# Patient Record
Sex: MALE | Race: WHITE | NOT HISPANIC OR LATINO | Employment: FULL TIME | URBAN - METROPOLITAN AREA
[De-identification: names, ages, dates, MRNs, and addresses within clinical notes are randomized per-mention and may not be internally consistent; named-entity substitution may affect disease eponyms.]

---

## 2019-07-02 ENCOUNTER — MYC MEDICAL ADVICE (OUTPATIENT)
Dept: INTERNAL MEDICINE | Facility: CLINIC | Age: 29
End: 2019-07-02

## 2019-07-05 ENCOUNTER — HOSPITAL ENCOUNTER (OUTPATIENT)
Dept: LAB | Facility: CLINIC | Age: 29
Discharge: HOME | End: 2019-07-05
Attending: INTERNAL MEDICINE | Admitting: INTERNAL MEDICINE
Payer: COMMERCIAL

## 2019-07-05 ENCOUNTER — TELEPHONE (OUTPATIENT)
Dept: INTERNAL MEDICINE | Facility: CLINIC | Age: 29
End: 2019-07-05

## 2019-07-05 ENCOUNTER — OFFICE VISIT (OUTPATIENT)
Dept: INTERNAL MEDICINE | Facility: CLINIC | Age: 29
End: 2019-07-05
Payer: COMMERCIAL

## 2019-07-05 VITALS
SYSTOLIC BLOOD PRESSURE: 124 MMHG | WEIGHT: 200 LBS | RESPIRATION RATE: 24 BRPM | TEMPERATURE: 97.9 F | HEART RATE: 72 BPM | OXYGEN SATURATION: 97 % | DIASTOLIC BLOOD PRESSURE: 72 MMHG | HEIGHT: 73 IN | BODY MASS INDEX: 26.51 KG/M2

## 2019-07-05 DIAGNOSIS — R79.89 LFT ELEVATION: ICD-10-CM

## 2019-07-05 DIAGNOSIS — B07.9 VIRAL WARTS, UNSPECIFIED TYPE: ICD-10-CM

## 2019-07-05 DIAGNOSIS — Z11.3 SCREEN FOR STD (SEXUALLY TRANSMITTED DISEASE): Primary | ICD-10-CM

## 2019-07-05 DIAGNOSIS — Z00.00 ROUTINE GENERAL MEDICAL EXAMINATION AT A HEALTH CARE FACILITY: Primary | ICD-10-CM

## 2019-07-05 DIAGNOSIS — Z00.00 ENCOUNTER FOR PREVENTATIVE ADULT HEALTH CARE EXAMINATION: Primary | ICD-10-CM

## 2019-07-05 LAB
ALBUMIN SERPL-MCNC: 3.8 G/DL (ref 3.4–5)
ALP SERPL-CCNC: 203 U/L (ref 40–150)
ALT SERPL W P-5'-P-CCNC: 112 U/L (ref 0–70)
ANION GAP SERPL CALCULATED.3IONS-SCNC: 9 MMOL/L (ref 3–14)
AST SERPL W P-5'-P-CCNC: 48 U/L (ref 0–45)
BILIRUB SERPL-MCNC: 0.5 MG/DL (ref 0.2–1.3)
BUN SERPL-MCNC: 11 MG/DL (ref 7–30)
CALCIUM SERPL-MCNC: 8.9 MG/DL (ref 8.5–10.1)
CHLORIDE SERPL-SCNC: 105 MMOL/L (ref 94–109)
CHOLEST SERPL-MCNC: 151 MG/DL
CO2 SERPL-SCNC: 26 MMOL/L (ref 20–32)
CREAT SERPL-MCNC: 0.82 MG/DL (ref 0.66–1.25)
ERYTHROCYTE [DISTWIDTH] IN BLOOD BY AUTOMATED COUNT: 12.8 % (ref 10–15)
GFR SERPL CREATININE-BSD FRML MDRD: >90 ML/MIN/{1.73_M2}
GLUCOSE SERPL-MCNC: 95 MG/DL (ref 70–99)
HCT VFR BLD AUTO: 46.5 % (ref 40–53)
HDLC SERPL-MCNC: 53 MG/DL
HGB BLD-MCNC: 15.2 G/DL (ref 13.3–17.7)
LDLC SERPL CALC-MCNC: 84 MG/DL
MCH RBC QN AUTO: 29.2 PG (ref 26.5–33)
MCHC RBC AUTO-ENTMCNC: 32.7 G/DL (ref 31.5–36.5)
MCV RBC AUTO: 89 FL (ref 78–100)
NONHDLC SERPL-MCNC: 98 MG/DL
PLATELET # BLD AUTO: 207 10E9/L (ref 150–450)
POTASSIUM SERPL-SCNC: 4.4 MMOL/L (ref 3.4–5.3)
PROT SERPL-MCNC: 7.5 G/DL (ref 6.8–8.8)
RBC # BLD AUTO: 5.2 10E12/L (ref 4.4–5.9)
SODIUM SERPL-SCNC: 140 MMOL/L (ref 133–144)
TRIGL SERPL-MCNC: 68 MG/DL
TSH SERPL DL<=0.005 MIU/L-ACNC: 2.31 MU/L (ref 0.4–4)
WBC # BLD AUTO: 6.4 10E9/L (ref 4–11)

## 2019-07-05 PROCEDURE — 80061 LIPID PANEL: CPT | Performed by: INTERNAL MEDICINE

## 2019-07-05 PROCEDURE — 86780 TREPONEMA PALLIDUM: CPT | Performed by: INTERNAL MEDICINE

## 2019-07-05 PROCEDURE — 84443 ASSAY THYROID STIM HORMONE: CPT | Performed by: INTERNAL MEDICINE

## 2019-07-05 PROCEDURE — 87389 HIV-1 AG W/HIV-1&-2 AB AG IA: CPT | Performed by: INTERNAL MEDICINE

## 2019-07-05 PROCEDURE — 87491 CHLMYD TRACH DNA AMP PROBE: CPT | Performed by: INTERNAL MEDICINE

## 2019-07-05 PROCEDURE — 86803 HEPATITIS C AB TEST: CPT | Performed by: INTERNAL MEDICINE

## 2019-07-05 PROCEDURE — 99395 PREV VISIT EST AGE 18-39: CPT | Mod: 25 | Performed by: INTERNAL MEDICINE

## 2019-07-05 PROCEDURE — 36415 COLL VENOUS BLD VENIPUNCTURE: CPT | Performed by: INTERNAL MEDICINE

## 2019-07-05 PROCEDURE — 17110 DESTRUCTION B9 LES UP TO 14: CPT | Performed by: INTERNAL MEDICINE

## 2019-07-05 PROCEDURE — 80053 COMPREHEN METABOLIC PANEL: CPT | Performed by: INTERNAL MEDICINE

## 2019-07-05 PROCEDURE — 87591 N.GONORRHOEAE DNA AMP PROB: CPT | Performed by: INTERNAL MEDICINE

## 2019-07-05 PROCEDURE — 85027 COMPLETE CBC AUTOMATED: CPT | Performed by: INTERNAL MEDICINE

## 2019-07-05 RX ORDER — OMEPRAZOLE 20 MG/1
20 TABLET, DELAYED RELEASE ORAL DAILY
COMMUNITY
End: 2020-10-14

## 2019-07-05 ASSESSMENT — ENCOUNTER SYMPTOMS
HEMATOCHEZIA: 0
JOINT SWELLING: 0
PALPITATIONS: 0
HEARTBURN: 1
ABDOMINAL PAIN: 0
HEADACHES: 0
EYE PAIN: 0
CHILLS: 0
NAUSEA: 0
CONSTIPATION: 0
WEAKNESS: 0
COUGH: 0
ARTHRALGIAS: 0
DYSURIA: 0
PARESTHESIAS: 0
MYALGIAS: 0
NERVOUS/ANXIOUS: 0
DIZZINESS: 0
FREQUENCY: 0
SORE THROAT: 0
SHORTNESS OF BREATH: 0
DIARRHEA: 0
FEVER: 0
HEMATURIA: 0

## 2019-07-05 ASSESSMENT — MIFFLIN-ST. JEOR: SCORE: 1935.03

## 2019-07-05 NOTE — NURSING NOTE
"Vital signs:  Temp: 97.9  F (36.6  C) Temp src: Oral BP: 124/72 Pulse: 72   Resp: 24 SpO2: 97 %     Height: 186.1 cm (6' 1.25\") Weight: 90.7 kg (200 lb)  Estimated body mass index is 26.21 kg/m  as calculated from the following:    Height as of this encounter: 1.861 m (6' 1.25\").    Weight as of this encounter: 90.7 kg (200 lb).            "

## 2019-07-05 NOTE — PROGRESS NOTES
SUBJECTIVE:   CC: Raudel Duran is an 28 year old male who presents for preventative health visit.     Healthy Habits:     Getting at least 3 servings of Calcium per day:  Yes    Bi-annual eye exam:  NO    Dental care twice a year:  Yes    Sleep apnea or symptoms of sleep apnea:  None    Diet:  Regular (no restrictions)    Frequency of exercise:  4-5 days/week    Duration of exercise:  45-60 minutes    Taking medications regularly:  Yes    Medication side effects:  None    PHQ-2 Total Score: 0    Additional concerns today:  Yes          PROBLEMS TO ADD ON...  Has had warts on the hands, unable to remove with OTC treatments.   Has h/o GERD on PPI treatment. symptoms are controlled. No nausea, vomiting, heartburns, bloating.      Today's PHQ-2 Score:   PHQ-2 ( 1999 [employer]) 7/5/2019   Q1: Little interest or pleasure in doing things 0   Q2: Feeling down, depressed or hopeless 0   PHQ-2 Score 0   Q1: Little interest or pleasure in doing things Not at all   Q2: Feeling down, depressed or hopeless Not at all   PHQ-2 Score 0       Abuse: Current or Past(Physical, Sexual or Emotional)- No  Do you feel safe in your environment? Yes    Social History     Tobacco Use     Smoking status: Never Smoker     Smokeless tobacco: Never Used   Substance Use Topics     Alcohol use: Yes     Comment: 7 drinks per week     If you drink alcohol do you typically have >3 drinks per day or >7 drinks per week? No    Alcohol Use 7/5/2019   Prescreen: >3 drinks/day or >7 drinks/week? No       Last PSA: No results found for: PSA    Reviewed orders with patient. Reviewed health maintenance and updated orders accordingly - Yes  Lab work is in process  Labs reviewed in EPIC    Reviewed and updated as needed this visit by clinical staff  Tobacco  Allergies  Meds  Med Hx  Surg Hx  Fam Hx  Soc Hx        Reviewed and updated as needed this visit by Provider            Review of Systems   Constitutional: Negative for chills and fever.   HENT: Negative  "for congestion, ear pain, hearing loss and sore throat.    Eyes: Negative for pain and visual disturbance.   Respiratory: Negative for cough and shortness of breath.    Cardiovascular: Negative for chest pain, palpitations and peripheral edema.   Gastrointestinal: Positive for heartburn. Negative for abdominal pain, constipation, diarrhea, hematochezia and nausea.   Genitourinary: Negative for discharge, dysuria, frequency, genital sores, hematuria, impotence and urgency.   Musculoskeletal: Negative for arthralgias, joint swelling and myalgias.   Skin: Negative for rash.   Neurological: Negative for dizziness, weakness, headaches and paresthesias.   Psychiatric/Behavioral: Negative for mood changes. The patient is not nervous/anxious.          OBJECTIVE:   /72   Pulse 72   Temp 97.9  F (36.6  C) (Oral)   Resp 24   Ht 1.861 m (6' 1.25\")   Wt 90.7 kg (200 lb)   SpO2 97%   BMI 26.21 kg/m      Physical Exam  GENERAL: healthy, alert and no distress  EYES: Eyes grossly normal to inspection, PERRL and conjunctivae and sclerae normal  HENT: ear canals and TM's normal, nose and mouth without ulcers or lesions  NECK: no adenopathy, no asymmetry, masses, or scars and thyroid normal to palpation  RESP: lungs clear to auscultation - no rales, rhonchi or wheezes  CV: regular rate and rhythm, normal S1 S2, no S3 or S4, no murmur, click or rub, no peripheral edema and peripheral pulses strong  ABDOMEN: soft, nontender, no hepatosplenomegaly, no masses and bowel sounds normal  MS: no gross musculoskeletal defects noted, no edema  SKIN: no suspicious lesions or rashes  Right hand palmar surface 4 warts and right hand index finger wart 6-7 mm each   NEURO: Normal strength and tone, mentation intact and speech normal  PSYCH: mentation appears normal, affect normal/bright    Diagnostic Test Results:  Labs reviewed in Epic    ASSESSMENT/PLAN:       ICD-10-CM    1. Encounter for preventative adult health care examination " "Z00.00 CBC with platelets     Comprehensive metabolic panel     Lipid panel reflex to direct LDL Fasting     TSH with free T4 reflex     HIV Antigen Antibody Combo     Hepatitis C antibody     NEISSERIA GONORRHOEA PCR     CHLAMYDIA TRACHOMATIS PCR     Treponema Abs w Reflex to RPR and Titer   2. Viral warts, unspecified type B07.9 DESTRUCT BENIGN LESION, UP TO 14     Cryotherapy with liquid nitrogen applied to 5 warts on hands       COUNSELING:   Reviewed preventive health counseling, as reflected in patient instructions       Regular exercise       Healthy diet/nutrition       Vision screening       Hearing screening       HIV screeninx in teen years, 1x in adult years, and at intervals if high risk    Estimated body mass index is 26.21 kg/m  as calculated from the following:    Height as of this encounter: 1.861 m (6' 1.25\").    Weight as of this encounter: 90.7 kg (200 lb).          reports that he has never smoked. He has never used smokeless tobacco.      Counseling Resources:  ATP IV Guidelines  Pooled Cohorts Equation Calculator  FRAX Risk Assessment  ICSI Preventive Guidelines  Dietary Guidelines for Americans, 2010  USDA's MyPlate  ASA Prophylaxis  Lung CA Screening    Avila Cadena MD  [facility]  "

## 2019-07-06 LAB
HCV AB SERPL QL IA: NONREACTIVE
HIV 1+2 AB+HIV1 P24 AG SERPL QL IA: NONREACTIVE
T PALLIDUM AB SER QL: NONREACTIVE

## 2019-07-07 LAB
C TRACH DNA SPEC QL NAA+PROBE: NEGATIVE
N GONORRHOEA DNA SPEC QL NAA+PROBE: NEGATIVE
SPECIMEN SOURCE: NORMAL
SPECIMEN SOURCE: NORMAL

## 2019-07-09 ENCOUNTER — HOSPITAL ENCOUNTER (OUTPATIENT)
Dept: ULTRASOUND IMAGING | Facility: CLINIC | Age: 29
Discharge: HOME | End: 2019-07-09
Attending: INTERNAL MEDICINE | Admitting: INTERNAL MEDICINE
Payer: COMMERCIAL

## 2019-07-09 DIAGNOSIS — R79.89 LFT ELEVATION: ICD-10-CM

## 2019-07-09 PROCEDURE — 86708 HEPATITIS A ANTIBODY: CPT | Performed by: INTERNAL MEDICINE

## 2019-07-09 PROCEDURE — 82390 ASSAY OF CERULOPLASMIN: CPT | Performed by: INTERNAL MEDICINE

## 2019-07-09 PROCEDURE — 82728 ASSAY OF FERRITIN: CPT | Performed by: INTERNAL MEDICINE

## 2019-07-09 PROCEDURE — 80076 HEPATIC FUNCTION PANEL: CPT | Performed by: INTERNAL MEDICINE

## 2019-07-09 PROCEDURE — 86038 ANTINUCLEAR ANTIBODIES: CPT | Performed by: INTERNAL MEDICINE

## 2019-07-09 PROCEDURE — 87340 HEPATITIS B SURFACE AG IA: CPT | Performed by: INTERNAL MEDICINE

## 2019-07-09 PROCEDURE — 76705 ECHO EXAM OF ABDOMEN: CPT

## 2019-07-09 PROCEDURE — 36415 COLL VENOUS BLD VENIPUNCTURE: CPT | Performed by: INTERNAL MEDICINE

## 2019-07-09 PROCEDURE — 86039 ANTINUCLEAR ANTIBODIES (ANA): CPT | Performed by: INTERNAL MEDICINE

## 2019-07-10 LAB
ALBUMIN SERPL-MCNC: 4 G/DL (ref 3.4–5)
ALP SERPL-CCNC: 218 U/L (ref 40–150)
ALT SERPL W P-5'-P-CCNC: 115 U/L (ref 0–70)
ANA PAT SER IF-IMP: ABNORMAL
ANA SER QL IF: ABNORMAL
ANA TITR SER IF: ABNORMAL {TITER}
AST SERPL W P-5'-P-CCNC: 49 U/L (ref 0–45)
BILIRUB DIRECT SERPL-MCNC: 0.2 MG/DL (ref 0–0.2)
BILIRUB SERPL-MCNC: 0.6 MG/DL (ref 0.2–1.3)
FERRITIN SERPL-MCNC: 114 NG/ML (ref 26–388)
HAV IGG SER QL IA: NONREACTIVE
HBV SURFACE AG SERPL QL IA: NONREACTIVE
PROT SERPL-MCNC: 7.9 G/DL (ref 6.8–8.8)

## 2019-07-11 ENCOUNTER — TELEPHONE (OUTPATIENT)
Dept: INTERNAL MEDICINE | Facility: CLINIC | Age: 29
End: 2019-07-11

## 2019-07-11 LAB — CERULOPLASMIN SERPL-MCNC: 26 MG/DL (ref 23–53)

## 2019-07-11 NOTE — TELEPHONE ENCOUNTER
Call to pt and advised him of results.   He will call [facility].   Faxed referral and results to [facility].       Result Notes for US Abdomen Limited     Notes recorded by Avila Cadena MD on 7/10/2019 at 4:30 PM CDT  Fatty changes in the liver. Suggest to assess him with GI.

## 2019-09-21 ENCOUNTER — ANESTHESIA EVENT (OUTPATIENT)
Dept: SURGERY | Facility: CLINIC | Age: 29
End: 2019-09-21
Payer: COMMERCIAL

## 2019-09-21 ENCOUNTER — ANESTHESIA (OUTPATIENT)
Dept: SURGERY | Facility: CLINIC | Age: 29
End: 2019-09-21
Payer: COMMERCIAL

## 2019-09-21 ENCOUNTER — APPOINTMENT (OUTPATIENT)
Dept: CT IMAGING | Facility: CLINIC | Age: 29
End: 2019-09-21
Attending: EMERGENCY MEDICINE
Payer: COMMERCIAL

## 2019-09-21 ENCOUNTER — APPOINTMENT (OUTPATIENT)
Dept: ULTRASOUND IMAGING | Facility: CLINIC | Age: 29
End: 2019-09-21
Attending: EMERGENCY MEDICINE
Payer: COMMERCIAL

## 2019-09-21 ENCOUNTER — HOSPITAL ENCOUNTER (OUTPATIENT)
Facility: CLINIC | Age: 29
Discharge: HOME | End: 2019-09-21
Attending: EMERGENCY MEDICINE | Admitting: SURGERY
Payer: COMMERCIAL

## 2019-09-21 VITALS
TEMPERATURE: 97.9 F | OXYGEN SATURATION: 97 % | HEIGHT: 74 IN | SYSTOLIC BLOOD PRESSURE: 169 MMHG | WEIGHT: 199 LBS | BODY MASS INDEX: 25.54 KG/M2 | DIASTOLIC BLOOD PRESSURE: 90 MMHG | HEART RATE: 93 BPM | RESPIRATION RATE: 14 BRPM

## 2019-09-21 DIAGNOSIS — K82.8 GALLBLADDER SLUDGE: ICD-10-CM

## 2019-09-21 DIAGNOSIS — K83.8 COMMON BILE DUCT DILATATION: ICD-10-CM

## 2019-09-21 DIAGNOSIS — R10.13 ABDOMINAL PAIN, EPIGASTRIC: ICD-10-CM

## 2019-09-21 LAB
ALBUMIN SERPL-MCNC: 4.3 G/DL (ref 3.4–5)
ALP SERPL-CCNC: 291 U/L (ref 40–150)
ALT SERPL W P-5'-P-CCNC: 236 U/L (ref 0–70)
ANION GAP SERPL CALCULATED.3IONS-SCNC: 3 MMOL/L (ref 3–14)
AST SERPL W P-5'-P-CCNC: 111 U/L (ref 0–45)
BASOPHILS # BLD AUTO: 0.1 10E9/L (ref 0–0.2)
BASOPHILS NFR BLD AUTO: 0.5 %
BILIRUB SERPL-MCNC: 0.6 MG/DL (ref 0.2–1.3)
BUN SERPL-MCNC: 15 MG/DL (ref 7–30)
CALCIUM SERPL-MCNC: 9.3 MG/DL (ref 8.5–10.1)
CHLORIDE SERPL-SCNC: 104 MMOL/L (ref 94–109)
CO2 SERPL-SCNC: 31 MMOL/L (ref 20–32)
CREAT SERPL-MCNC: 0.8 MG/DL (ref 0.66–1.25)
DIFFERENTIAL METHOD BLD: NORMAL
EOSINOPHIL # BLD AUTO: 0.3 10E9/L (ref 0–0.7)
EOSINOPHIL NFR BLD AUTO: 2.8 %
ERYTHROCYTE [DISTWIDTH] IN BLOOD BY AUTOMATED COUNT: 12.6 % (ref 10–15)
GFR SERPL CREATININE-BSD FRML MDRD: >90 ML/MIN/{1.73_M2}
GLUCOSE SERPL-MCNC: 120 MG/DL (ref 70–99)
HCT VFR BLD AUTO: 46.9 % (ref 40–53)
HGB BLD-MCNC: 15.9 G/DL (ref 13.3–17.7)
IMM GRANULOCYTES # BLD: 0 10E9/L (ref 0–0.4)
IMM GRANULOCYTES NFR BLD: 0.2 %
LIPASE SERPL-CCNC: 104 U/L (ref 73–393)
LYMPHOCYTES # BLD AUTO: 3.6 10E9/L (ref 0.8–5.3)
LYMPHOCYTES NFR BLD AUTO: 32.9 %
MCH RBC QN AUTO: 30 PG (ref 26.5–33)
MCHC RBC AUTO-ENTMCNC: 33.9 G/DL (ref 31.5–36.5)
MCV RBC AUTO: 89 FL (ref 78–100)
MONOCYTES # BLD AUTO: 0.7 10E9/L (ref 0–1.3)
MONOCYTES NFR BLD AUTO: 6.7 %
NEUTROPHILS # BLD AUTO: 6.2 10E9/L (ref 1.6–8.3)
NEUTROPHILS NFR BLD AUTO: 56.9 %
NRBC # BLD AUTO: 0 10*3/UL
NRBC BLD AUTO-RTO: 0 /100
PLATELET # BLD AUTO: 257 10E9/L (ref 150–450)
POTASSIUM SERPL-SCNC: 3.9 MMOL/L (ref 3.4–5.3)
PROT SERPL-MCNC: 8.3 G/DL (ref 6.8–8.8)
RBC # BLD AUTO: 5.3 10E12/L (ref 4.4–5.9)
SODIUM SERPL-SCNC: 138 MMOL/L (ref 133–144)
WBC # BLD AUTO: 10.9 10E9/L (ref 4–11)

## 2019-09-21 PROCEDURE — 25000128 H RX IP 250 OP 636: Performed by: REGISTERED NURSE

## 2019-09-21 PROCEDURE — 83690 ASSAY OF LIPASE: CPT | Performed by: EMERGENCY MEDICINE

## 2019-09-21 PROCEDURE — 99204 OFFICE O/P NEW MOD 45 MIN: CPT | Mod: 57 | Performed by: SURGERY

## 2019-09-21 PROCEDURE — 96375 TX/PRO/DX INJ NEW DRUG ADDON: CPT | Mod: 59

## 2019-09-21 PROCEDURE — 25000566 ZZH SEVOFLURANE, EA 15 MIN: Performed by: SURGERY

## 2019-09-21 PROCEDURE — 80053 COMPREHEN METABOLIC PANEL: CPT | Performed by: EMERGENCY MEDICINE

## 2019-09-21 PROCEDURE — 47562 LAPAROSCOPIC CHOLECYSTECTOMY: CPT | Performed by: SURGERY

## 2019-09-21 PROCEDURE — 76705 ECHO EXAM OF ABDOMEN: CPT

## 2019-09-21 PROCEDURE — 40000170 ZZH STATISTIC PRE-PROCEDURE ASSESSMENT II: Performed by: SURGERY

## 2019-09-21 PROCEDURE — 96376 TX/PRO/DX INJ SAME DRUG ADON: CPT | Mod: 59

## 2019-09-21 PROCEDURE — 99207 ZZC NO CHARGE VISIT/PATIENT NOT SEEN: CPT | Performed by: INTERNAL MEDICINE

## 2019-09-21 PROCEDURE — 99285 EMERGENCY DEPT VISIT HI MDM: CPT | Mod: 25

## 2019-09-21 PROCEDURE — 71000014 ZZH RECOVERY PHASE 1 LEVEL 2 FIRST HR: Performed by: SURGERY

## 2019-09-21 PROCEDURE — 36000058 ZZH SURGERY LEVEL 3 EA 15 ADDTL MIN: Performed by: SURGERY

## 2019-09-21 PROCEDURE — 25000125 ZZHC RX 250: Performed by: REGISTERED NURSE

## 2019-09-21 PROCEDURE — 25800030 ZZH RX IP 258 OP 636: Performed by: ANESTHESIOLOGY

## 2019-09-21 PROCEDURE — 25000125 ZZHC RX 250: Performed by: EMERGENCY MEDICINE

## 2019-09-21 PROCEDURE — 25000128 H RX IP 250 OP 636: Performed by: EMERGENCY MEDICINE

## 2019-09-21 PROCEDURE — 25000125 ZZHC RX 250: Performed by: SURGERY

## 2019-09-21 PROCEDURE — 96374 THER/PROPH/DIAG INJ IV PUSH: CPT

## 2019-09-21 PROCEDURE — 25000128 H RX IP 250 OP 636: Performed by: ANESTHESIOLOGY

## 2019-09-21 PROCEDURE — 85025 COMPLETE CBC W/AUTO DIFF WBC: CPT | Performed by: EMERGENCY MEDICINE

## 2019-09-21 PROCEDURE — 25800025 ZZH RX 258: Performed by: SURGERY

## 2019-09-21 PROCEDURE — 88304 TISSUE EXAM BY PATHOLOGIST: CPT | Mod: 26 | Performed by: SURGERY

## 2019-09-21 PROCEDURE — 25800030 ZZH RX IP 258 OP 636: Performed by: EMERGENCY MEDICINE

## 2019-09-21 PROCEDURE — 88304 TISSUE EXAM BY PATHOLOGIST: CPT | Performed by: SURGERY

## 2019-09-21 PROCEDURE — 37000008 ZZH ANESTHESIA TECHNICAL FEE, 1ST 30 MIN: Performed by: SURGERY

## 2019-09-21 PROCEDURE — 25000128 H RX IP 250 OP 636: Performed by: SURGERY

## 2019-09-21 PROCEDURE — 36000056 ZZH SURGERY LEVEL 3 1ST 30 MIN: Performed by: SURGERY

## 2019-09-21 PROCEDURE — 25000132 ZZH RX MED GY IP 250 OP 250 PS 637: Performed by: EMERGENCY MEDICINE

## 2019-09-21 PROCEDURE — 37000009 ZZH ANESTHESIA TECHNICAL FEE, EACH ADDTL 15 MIN: Performed by: SURGERY

## 2019-09-21 PROCEDURE — 74177 CT ABD & PELVIS W/CONTRAST: CPT

## 2019-09-21 PROCEDURE — 71000015 ZZH RECOVERY PHASE 1 LEVEL 2 EA ADDTL HR: Performed by: SURGERY

## 2019-09-21 PROCEDURE — 25000132 ZZH RX MED GY IP 250 OP 250 PS 637: Performed by: ANESTHESIOLOGY

## 2019-09-21 PROCEDURE — 27210794 ZZH OR GENERAL SUPPLY STERILE: Performed by: SURGERY

## 2019-09-21 RX ORDER — ONDANSETRON 4 MG/1
4 TABLET, ORALLY DISINTEGRATING ORAL EVERY 6 HOURS PRN
Status: CANCELLED | OUTPATIENT
Start: 2019-09-21

## 2019-09-21 RX ORDER — HYDROMORPHONE HYDROCHLORIDE 1 MG/ML
0.5 INJECTION, SOLUTION INTRAMUSCULAR; INTRAVENOUS; SUBCUTANEOUS
Status: DISCONTINUED | OUTPATIENT
Start: 2019-09-21 | End: 2019-09-21 | Stop reason: HOSPADM

## 2019-09-21 RX ORDER — FENTANYL CITRATE 50 UG/ML
INJECTION, SOLUTION INTRAMUSCULAR; INTRAVENOUS PRN
Status: DISCONTINUED | OUTPATIENT
Start: 2019-09-21 | End: 2019-09-21

## 2019-09-21 RX ORDER — HYDROCODONE BITARTRATE AND ACETAMINOPHEN 5; 325 MG/1; MG/1
1 TABLET ORAL EVERY 6 HOURS PRN
Qty: 10 TABLET | Refills: 0 | Status: SHIPPED | OUTPATIENT
Start: 2019-09-21 | End: 2019-10-23

## 2019-09-21 RX ORDER — PROCHLORPERAZINE MALEATE 10 MG
10 TABLET ORAL EVERY 6 HOURS PRN
Status: CANCELLED | OUTPATIENT
Start: 2019-09-21

## 2019-09-21 RX ORDER — SODIUM CHLORIDE, SODIUM LACTATE, POTASSIUM CHLORIDE, CALCIUM CHLORIDE 600; 310; 30; 20 MG/100ML; MG/100ML; MG/100ML; MG/100ML
INJECTION, SOLUTION INTRAVENOUS CONTINUOUS
Status: DISCONTINUED | OUTPATIENT
Start: 2019-09-21 | End: 2019-09-21 | Stop reason: HOSPADM

## 2019-09-21 RX ORDER — ONDANSETRON 2 MG/ML
4 INJECTION INTRAMUSCULAR; INTRAVENOUS EVERY 30 MIN PRN
Status: DISCONTINUED | OUTPATIENT
Start: 2019-09-21 | End: 2019-09-21 | Stop reason: HOSPADM

## 2019-09-21 RX ORDER — MEPERIDINE HYDROCHLORIDE 25 MG/ML
12.5 INJECTION INTRAMUSCULAR; INTRAVENOUS; SUBCUTANEOUS EVERY 5 MIN PRN
Status: DISCONTINUED | OUTPATIENT
Start: 2019-09-21 | End: 2019-09-21 | Stop reason: HOSPADM

## 2019-09-21 RX ORDER — SODIUM CHLORIDE 9 MG/ML
1000 INJECTION, SOLUTION INTRAVENOUS CONTINUOUS
Status: DISCONTINUED | OUTPATIENT
Start: 2019-09-21 | End: 2019-09-21 | Stop reason: HOSPADM

## 2019-09-21 RX ORDER — DEXAMETHASONE SODIUM PHOSPHATE 4 MG/ML
INJECTION, SOLUTION INTRA-ARTICULAR; INTRALESIONAL; INTRAMUSCULAR; INTRAVENOUS; SOFT TISSUE PRN
Status: DISCONTINUED | OUTPATIENT
Start: 2019-09-21 | End: 2019-09-21

## 2019-09-21 RX ORDER — LIDOCAINE HYDROCHLORIDE 20 MG/ML
INJECTION, SOLUTION INFILTRATION; PERINEURAL PRN
Status: DISCONTINUED | OUTPATIENT
Start: 2019-09-21 | End: 2019-09-21

## 2019-09-21 RX ORDER — HYDROMORPHONE HYDROCHLORIDE 1 MG/ML
.3-.5 INJECTION, SOLUTION INTRAMUSCULAR; INTRAVENOUS; SUBCUTANEOUS EVERY 5 MIN PRN
Status: DISCONTINUED | OUTPATIENT
Start: 2019-09-21 | End: 2019-09-21 | Stop reason: HOSPADM

## 2019-09-21 RX ORDER — NALOXONE HYDROCHLORIDE 0.4 MG/ML
.1-.4 INJECTION, SOLUTION INTRAMUSCULAR; INTRAVENOUS; SUBCUTANEOUS
Status: CANCELLED | OUTPATIENT
Start: 2019-09-21 | End: 2019-09-22

## 2019-09-21 RX ORDER — FENTANYL CITRATE 50 UG/ML
25-50 INJECTION, SOLUTION INTRAMUSCULAR; INTRAVENOUS
Status: DISCONTINUED | OUTPATIENT
Start: 2019-09-21 | End: 2019-09-21 | Stop reason: HOSPADM

## 2019-09-21 RX ORDER — NALOXONE HYDROCHLORIDE 0.4 MG/ML
.1-.4 INJECTION, SOLUTION INTRAMUSCULAR; INTRAVENOUS; SUBCUTANEOUS
Status: DISCONTINUED | OUTPATIENT
Start: 2019-09-21 | End: 2019-09-21 | Stop reason: HOSPADM

## 2019-09-21 RX ORDER — ONDANSETRON 4 MG/1
4 TABLET, ORALLY DISINTEGRATING ORAL EVERY 30 MIN PRN
Status: DISCONTINUED | OUTPATIENT
Start: 2019-09-21 | End: 2019-09-21 | Stop reason: HOSPADM

## 2019-09-21 RX ORDER — ONDANSETRON 2 MG/ML
INJECTION INTRAMUSCULAR; INTRAVENOUS PRN
Status: DISCONTINUED | OUTPATIENT
Start: 2019-09-21 | End: 2019-09-21

## 2019-09-21 RX ORDER — CEFAZOLIN SODIUM 1 G/3ML
1 INJECTION, POWDER, FOR SOLUTION INTRAMUSCULAR; INTRAVENOUS SEE ADMIN INSTRUCTIONS
Status: DISCONTINUED | OUTPATIENT
Start: 2019-09-21 | End: 2019-09-21 | Stop reason: HOSPADM

## 2019-09-21 RX ORDER — PROPOFOL 10 MG/ML
INJECTION, EMULSION INTRAVENOUS PRN
Status: DISCONTINUED | OUTPATIENT
Start: 2019-09-21 | End: 2019-09-21

## 2019-09-21 RX ORDER — IOPAMIDOL 755 MG/ML
100 INJECTION, SOLUTION INTRAVASCULAR ONCE
Status: COMPLETED | OUTPATIENT
Start: 2019-09-21 | End: 2019-09-21

## 2019-09-21 RX ORDER — HYDROMORPHONE HYDROCHLORIDE 1 MG/ML
.3-.5 INJECTION, SOLUTION INTRAMUSCULAR; INTRAVENOUS; SUBCUTANEOUS
Status: CANCELLED | OUTPATIENT
Start: 2019-09-21

## 2019-09-21 RX ORDER — NEOSTIGMINE METHYLSULFATE 1 MG/ML
VIAL (ML) INJECTION PRN
Status: DISCONTINUED | OUTPATIENT
Start: 2019-09-21 | End: 2019-09-21

## 2019-09-21 RX ORDER — GLYCOPYRROLATE 0.2 MG/ML
INJECTION, SOLUTION INTRAMUSCULAR; INTRAVENOUS PRN
Status: DISCONTINUED | OUTPATIENT
Start: 2019-09-21 | End: 2019-09-21

## 2019-09-21 RX ORDER — CEFAZOLIN SODIUM 2 G/100ML
2 INJECTION, SOLUTION INTRAVENOUS
Status: COMPLETED | OUTPATIENT
Start: 2019-09-21 | End: 2019-09-21

## 2019-09-21 RX ORDER — KETOROLAC TROMETHAMINE 15 MG/ML
15 INJECTION, SOLUTION INTRAMUSCULAR; INTRAVENOUS ONCE
Status: COMPLETED | OUTPATIENT
Start: 2019-09-21 | End: 2019-09-21

## 2019-09-21 RX ORDER — DEXTROSE MONOHYDRATE, SODIUM CHLORIDE, AND POTASSIUM CHLORIDE 50; 1.49; 4.5 G/1000ML; G/1000ML; G/1000ML
INJECTION, SOLUTION INTRAVENOUS CONTINUOUS
Status: CANCELLED | OUTPATIENT
Start: 2019-09-21

## 2019-09-21 RX ORDER — LIDOCAINE 40 MG/G
CREAM TOPICAL
Status: CANCELLED | OUTPATIENT
Start: 2019-09-21

## 2019-09-21 RX ORDER — OXYCODONE AND ACETAMINOPHEN 5; 325 MG/1; MG/1
1-2 TABLET ORAL EVERY 4 HOURS PRN
Status: CANCELLED | OUTPATIENT
Start: 2019-09-21

## 2019-09-21 RX ORDER — ONDANSETRON 2 MG/ML
4 INJECTION INTRAMUSCULAR; INTRAVENOUS EVERY 6 HOURS PRN
Status: CANCELLED | OUTPATIENT
Start: 2019-09-21

## 2019-09-21 RX ORDER — HYDROCODONE BITARTRATE AND ACETAMINOPHEN 5; 325 MG/1; MG/1
1 TABLET ORAL
Status: COMPLETED | OUTPATIENT
Start: 2019-09-21 | End: 2019-09-21

## 2019-09-21 RX ADMIN — GLYCOPYRROLATE 0.7 MG: 0.2 INJECTION, SOLUTION INTRAMUSCULAR; INTRAVENOUS at 17:20

## 2019-09-21 RX ADMIN — SODIUM CHLORIDE, POTASSIUM CHLORIDE, SODIUM LACTATE AND CALCIUM CHLORIDE: 600; 310; 30; 20 INJECTION, SOLUTION INTRAVENOUS at 16:07

## 2019-09-21 RX ADMIN — SODIUM CHLORIDE 1000 ML: 9 INJECTION, SOLUTION INTRAVENOUS at 09:56

## 2019-09-21 RX ADMIN — LIDOCAINE HYDROCHLORIDE 100 MG: 20 INJECTION, SOLUTION INFILTRATION; PERINEURAL at 16:19

## 2019-09-21 RX ADMIN — FENTANYL CITRATE 50 MCG: 50 INJECTION INTRAMUSCULAR; INTRAVENOUS at 18:25

## 2019-09-21 RX ADMIN — PROPOFOL 200 MG: 10 INJECTION, EMULSION INTRAVENOUS at 16:19

## 2019-09-21 RX ADMIN — HYDROCODONE BITARTRATE AND ACETAMINOPHEN 1 TABLET: 5; 325 TABLET ORAL at 19:20

## 2019-09-21 RX ADMIN — HYDROMORPHONE HYDROCHLORIDE 0.5 MG: 1 INJECTION, SOLUTION INTRAMUSCULAR; INTRAVENOUS; SUBCUTANEOUS at 10:40

## 2019-09-21 RX ADMIN — DEXAMETHASONE SODIUM PHOSPHATE 4 MG: 4 INJECTION, SOLUTION INTRA-ARTICULAR; INTRALESIONAL; INTRAMUSCULAR; INTRAVENOUS; SOFT TISSUE at 16:22

## 2019-09-21 RX ADMIN — FENTANYL CITRATE 50 MCG: 50 INJECTION INTRAMUSCULAR; INTRAVENOUS at 18:40

## 2019-09-21 RX ADMIN — KETOROLAC TROMETHAMINE 15 MG: 15 INJECTION, SOLUTION INTRAMUSCULAR; INTRAVENOUS at 13:02

## 2019-09-21 RX ADMIN — MIDAZOLAM 2 MG: 1 INJECTION INTRAMUSCULAR; INTRAVENOUS at 16:15

## 2019-09-21 RX ADMIN — LIDOCAINE HYDROCHLORIDE 30 ML: 20 SOLUTION ORAL; TOPICAL at 09:49

## 2019-09-21 RX ADMIN — ROCURONIUM BROMIDE 50 MG: 10 INJECTION INTRAVENOUS at 16:25

## 2019-09-21 RX ADMIN — ONDANSETRON 4 MG: 2 INJECTION INTRAMUSCULAR; INTRAVENOUS at 11:10

## 2019-09-21 RX ADMIN — ONDANSETRON 4 MG: 2 INJECTION INTRAMUSCULAR; INTRAVENOUS at 17:03

## 2019-09-21 RX ADMIN — SODIUM CHLORIDE, POTASSIUM CHLORIDE, SODIUM LACTATE AND CALCIUM CHLORIDE: 600; 310; 30; 20 INJECTION, SOLUTION INTRAVENOUS at 17:02

## 2019-09-21 RX ADMIN — SODIUM CHLORIDE 1000 ML: 9 INJECTION, SOLUTION INTRAVENOUS at 10:42

## 2019-09-21 RX ADMIN — NEOSTIGMINE METHYLSULFATE 4.5 MG: 1 INJECTION, SOLUTION INTRAVENOUS at 17:20

## 2019-09-21 RX ADMIN — ONDANSETRON 4 MG: 2 INJECTION INTRAMUSCULAR; INTRAVENOUS at 09:59

## 2019-09-21 RX ADMIN — CEFAZOLIN SODIUM 2 G: 2 INJECTION, SOLUTION INTRAVENOUS at 16:35

## 2019-09-21 RX ADMIN — HYDROMORPHONE HYDROCHLORIDE 0.5 MG: 1 INJECTION, SOLUTION INTRAMUSCULAR; INTRAVENOUS; SUBCUTANEOUS at 10:00

## 2019-09-21 RX ADMIN — SUCCINYLCHOLINE CHLORIDE 100 MG: 20 INJECTION, SOLUTION INTRAMUSCULAR; INTRAVENOUS; PARENTERAL at 16:19

## 2019-09-21 RX ADMIN — IOPAMIDOL 100 ML: 755 INJECTION, SOLUTION INTRAVENOUS at 13:15

## 2019-09-21 RX ADMIN — FENTANYL CITRATE 100 MCG: 50 INJECTION, SOLUTION INTRAMUSCULAR; INTRAVENOUS at 16:19

## 2019-09-21 RX ADMIN — SODIUM CHLORIDE 70 ML: 9 INJECTION, SOLUTION INTRAVENOUS at 13:16

## 2019-09-21 ASSESSMENT — MIFFLIN-ST. JEOR: SCORE: 1948.76

## 2019-09-21 ASSESSMENT — ENCOUNTER SYMPTOMS
VOMITING: 0
ABDOMINAL PAIN: 1
NAUSEA: 1

## 2019-09-21 NOTE — OR NURSING
1740: Md Chandra at bedside stated that patient could go at home or stay overnight. Patient decided to go at home

## 2019-09-21 NOTE — ANESTHESIA CARE TRANSFER NOTE
Patient: Raudel Duran    Procedure(s):  CHOLECYSTECTOMY, LAPAROSCOPIC    Diagnosis: unknown  Diagnosis Additional Information: No value filed.    Anesthesia Type:   General, RSI, ETT     Note:  Airway :Face Mask  Patient transferred to:PACU  Comments: Transferred to PACU, spontaneous respirations, 10L oxygen via facemask.  All monitors and alarms on and functioning, VSS.  Patient awake, comfortable.  Report to PACU RN.Handoff Report: Identifed the Patient, Identified the Reponsible Provider, Reviewed the pertinent medical history, Discussed the surgical course, Reviewed Intra-OP anesthesia mangement and issues during anesthesia, Set expectations for post-procedure period and Allowed opportunity for questions and acknowledgement of understanding      Vitals: (Last set prior to Anesthesia Care Transfer)    CRNA VITALS  9/21/2019 1702 - 9/21/2019 1736      9/21/2019             NIBP:  153/74    Pulse:  101    NIBP Mean:  104    SpO2:  99 %    Resp Rate (observed):  9                Electronically Signed By: EDITH Hopkins CRNA  September 21, 2019  5:36 PM

## 2019-09-21 NOTE — OP NOTE
General Surgery Operative Note    PREOPERATIVE DIAGNOSIS:  gallstones    POSTOPERATIVE DIAGNOSIS:  Same    PROCEDURE:   Procedure(s):  CHOLECYSTECTOMY, LAPAROSCOPIC    ANESTHESIA:  General.      SURGEON:  Nicolas Chandra MD    ASSISTANT:  NONE.     INDICATIONS:  The patient has relentless epigastric pain and gallstones.  The risks, including but not limited to bleeding, infection, bile duct or bowel injury, anesthesia, and the possible need for an open approach were reviewed. The patient appeared to understand and wished to proceed with operation.    PROCEDURE:  The patient was taken to the operating suite.  The operative area was prepped and draped in a sterile fashion.  Surgeon initiated timeout was acknowledged.      Under general anesthesia the abdomen was insufflated through a periumbilical incision with a veress needle. Over 3 liters were place with low pressures. A 5mm trocar was placed. There was no injury seen when the camera was placed. Under direct vision a 5mm trocar was placed in the right upper quadrant and an 11mm trocar placed below the xiphoid. The gallbladder was grasped and adhesions taken down with blunt dissection and cautery. The peritoneal surfaces of the critical angle were cauterized posteriorly and anteriorly. The critical angle was dissected out, until there were only two structures remaining. Once these structures were identified,. The cystic artery was double clipped proximally, singly clipped distally and divided. It was pulsating. I took the gallbladder down from the top because the cystic duct appeared slightly large in diameter and might have stones in it. Once we were down to cystic duct and a tiny accessory artery half way up, I clipped the little artery proximally and divided it. There was only blood from the gallbladder. I then milked the duct contents up and doubly clipped the duct proximally, singly clipped it distally, then divided the ducts. The gallbladder was set aside and  hemostasis assured on the liver. Irrigation was used and suctioned out. The bed was dry and the clips were intact. The gallbladder was removed in a bag through the larger incision, which was enlarged as needed to permit passage of the gallbladder. We then irrigated again, and saw no sign of blood of bile leak. We checked for veress needle injury, there was none. The large trocar was removed and the fascia closed with 0 Vicryl. Marcaine was instilled. Gas was suctioned out. Trocars were removed. Sponge count was reported as correct. All incisions were closed with 4-0 Vicryl and steri-strips.            INTRAOPERATIVE FINDINGS:  Edematous cholecystitis, stones    Nicolas Chandra MD

## 2019-09-21 NOTE — PROGRESS NOTES
Hospitalist service initially called to admit patient.     Per subsequent discussions with Dr. Prateek webster ED, MD, plan will now be to admit to the general surgery service. Will go to OR later today for lap danielle.    Because patient is an otherwise healthy young male, no need for ongoing hospitalist involvement.    Please call if questions/concerns arise and hospitalist service will be happy to assist with care.     Salima Bates,   Internal Medicine - Hospitalist  9/21/2019  3:14 PM

## 2019-09-21 NOTE — BRIEF OP NOTE
[facility]    Brief Operative Note    Pre-operative diagnosis: Gallstones  Post-operative diagnosis same  Procedure: Procedure(s):  CHOLECYSTECTOMY, LAPAROSCOPIC  Surgeon: Surgeon(s) and Role:     * Nicolas Chandra MD - Primary  Anesthesia: General   Estimated blood loss: Minimal  Drains: None  Specimens: * No specimens in log *  Findings:   None.  Complications: None.  Implants:  * No implants in log *

## 2019-09-21 NOTE — ANESTHESIA PREPROCEDURE EVALUATION
Anesthesia Pre-Procedure Evaluation    Patient: Raudel Duran   MRN: [id number] : 1990          Preoperative Diagnosis: unknown    Procedure(s):  CHOLECYSTECTOMY, LAPAROSCOPIC    History reviewed. No pertinent past medical history.  History reviewed. No pertinent surgical history.    Anesthesia Evaluation     .             ROS/MED HX    ENT/Pulmonary:      (-) asthma   Neurologic:  - neg neurologic ROS     Cardiovascular:  - neg cardiovascular ROS       METS/Exercise Tolerance:     Hematologic:  - neg hematologic  ROS       Musculoskeletal:  - neg musculoskeletal ROS       GI/Hepatic:     (+) GERD cholecystitis/cholelithiasis,       Renal/Genitourinary:  - ROS Renal section negative       Endo:  - neg endo ROS       Psychiatric:  - neg psychiatric ROS       Infectious Disease:  - neg infectious disease ROS       Malignancy:         Other:                          Physical Exam  Normal systems: cardiovascular, pulmonary and dental    Airway   Mallampati: II  TM distance: >3 FB  Neck ROM: full    Dental     Cardiovascular       Pulmonary             Lab Results   Component Value Date    WBC 10.9 2019    HGB 15.9 2019    HCT 46.9 2019     2019     2019    POTASSIUM 3.9 2019    CHLORIDE 104 2019    CO2 31 2019    BUN 15 2019    CR 0.80 2019     (H) 2019    GULSHAN 9.3 2019    ALBUMIN 4.3 2019    PROTTOTAL 8.3 2019     (H) 2019     (H) 2019    ALKPHOS 291 (H) 2019    BILITOTAL 0.6 2019    LIPASE 104 2019    TSH 2.31 2019       Preop Vitals  BP Readings from Last 3 Encounters:   19 126/70   19 124/72    Pulse Readings from Last 3 Encounters:   19 79   19 72      Resp Readings from Last 3 Encounters:   19 20   19 24    SpO2 Readings from Last 3 Encounters:   19 98%   19 97%      Temp Readings from Last 1 Encounters:  "  09/21/19 36.6  C (97.8  F) (Oral)    Ht Readings from Last 1 Encounters:   09/21/19 1.89 m (6' 2.4\")      Wt Readings from Last 1 Encounters:   09/21/19 90.3 kg (199 lb)    Estimated body mass index is 25.28 kg/m  as calculated from the following:    Height as of this encounter: 1.89 m (6' 2.4\").    Weight as of this encounter: 90.3 kg (199 lb).       Anesthesia Plan      History & Physical Review  History and physical reviewed and following examination; no interval change.    ASA Status:  2 emergent.    NPO Status:  > 8 hours    Plan for General, RSI and ETT with Intravenous induction. Maintenance will be Balanced.    PONV prophylaxis:  Ondansetron (or other 5HT-3) and Dexamethasone or Solumedrol       Postoperative Care  Postoperative pain management:  IV analgesics.      Consents  Anesthetic plan, risks, benefits and alternatives discussed with:  Patient..                 Moris Silver, DO, DO  "

## 2019-09-21 NOTE — ED NOTES
"[facility]  ED Nurse Handoff Report    ED Chief complaint: Abdominal Pain      ED Diagnosis:   Final diagnoses:   Gallbladder sludge   Common bile duct dilatation   Abdominal pain, epigastric       Code Status: to be addressed by admitting MD    Allergies: No Known Allergies    Activity level - Baseline/Home:  Independent  Activity Level - Current:   Independent    Patient's Preferred language: English   Needed?: No    Isolation: No  Infection: Not Applicable  Bariatric?: No    Vital Signs:   Vitals:    09/21/19 1230 09/21/19 1300 09/21/19 1400 09/21/19 1430   BP: (!) 160/102 (!) 173/99 118/78 126/70   Pulse: 59 61 71 79   Resp:  12 30 20   Temp:       TempSrc:       SpO2: 98% 100% 98% 98%   Weight:       Height:           Cardiac Rhythm: ,        Pain level: 0-10 Pain Scale: 9    Is this patient confused?: No   Does this patient have a guardian?  No         If yes, is there guardianship documents in the Epic \"Code/ACP\" activity?  N/A         Guardian Notified?  N/A  [place] - Suicide Severity Rating Scale Completed?  No, secondary to screened neg on PSS-3  If yes, what color did the patient score?  N/A    Patient Report: Initial Complaint: epigastric pain  Focused Assessment: Pt reports sudden onset of epigastric pain this AM after eating toast and peanut butter. Pain not relieved by dilaudid. Pt also c/o nausea. Pt  Received 15mg toradol and pain much better. Pt alert and oriented. Pt had emesis x2 after IV dialudid.   Tests Performed: US and CT labs  Abnormal Results:   Abnormal Labs Reviewed   COMPREHENSIVE METABOLIC PANEL - Abnormal; Notable for the following components:       Result Value    Glucose 120 (*)     Alkaline Phosphatase 291 (*)      (*)      (*)     All other components within normal limits     Treatments provided: as above     Family Comments: Girlfriend was at bedside but went home    OBS brochure/video discussed/provided to patient/family: N/A           "    Name of person given brochure if not patient:               Relationship to patient:     ED Medications:   Medications   0.9% sodium chloride BOLUS (0 mLs Intravenous Stopped 9/21/19 1041)     Followed by   sodium chloride 0.9% infusion (1,000 mLs Intravenous New Bag 9/21/19 1042)   famotidine (PEPCID) injection 20 mg (20 mg Intravenous Not Given 9/21/19 1007)   HYDROmorphone (PF) (DILAUDID) injection 0.5 mg (0.5 mg Intravenous Given 9/21/19 1040)   ondansetron (ZOFRAN) injection 4 mg (4 mg Intravenous Given 9/21/19 1110)   lidocaine (XYLOCAINE) 2 % 15 mL, alum & mag hydroxide-simethicone (MYLANTA ES/MAALOX  ES) 15 mL GI Cocktail (30 mLs Oral Given 9/21/19 0949)   ketorolac (TORADOL) injection 15 mg (15 mg Intravenous Given 9/21/19 1302)   Saline flush (70 mLs Intravenous Given 9/21/19 1316)   iopamidol (ISOVUE-370) solution 100 mL (100 mLs Intravenous Given 9/21/19 1315)       Drips infusing?:  No    For the majority of the shift this patient was Green.   Interventions performed were .    Severe Sepsis OR Septic Shock Diagnosis Present: No    To be done/followed up on inpatient unit: ? surgery    ED NURSE PHONE NUMBER: *[phone]

## 2019-09-21 NOTE — CONSULTS
General Surgery [facility] Consultation/H&P    Raudel Duran MRN#: [id number]   Age: 28 year old YOB: 1990     Date of Admission:          9/21/2019  Reason for consult/H&P: Gallstone attacke   Surgeon:      Nicolas Chandra MD                  Chief Complaint:   Abdominal pain, epigastric         History of Present Illness:   This patient is a 28 year old  male who presented to the [facility] ER with epigastric pain that developed today. It did not relent. No trauma. Several mild similar attacks this summer. The pain goes through to his back. Denies fever, chills, change in BM or urination.   Denies having any previous episodes or abdominal surgery. History is obtained from the patient and chart.         Past Medical History:    has no past medical history on file.          Past Surgical History:   History reviewed. No pertinent surgical history.         Medications:     Prior to Admission medications    Medication Sig Start Date End Date Taking? Authorizing Provider   omeprazole (PRILOSEC OTC) 20 MG EC tablet Take 20 mg by mouth daily   Yes Reported, Patient            Allergies:   No Known Allergies         Social History:     Social History     Tobacco Use     Smoking status: Never Smoker     Smokeless tobacco: Never Used   Substance Use Topics     Alcohol use: Yes     Comment: rarely             Family History:    This patient has no significant relevant family history.  Family history is reviewed in detail.          Review of Systems:   Complete ROS is negative other than noted in the HPI.  C: NEGATIVE for fever, chills, change in weight  R: NEGATIVE for significant cough or SOB  CV: NEGATIVE for chest pain, palpitations or peripheral edema  GI:  NEGATIVE for dysuria, heartburn, or change in bowel habits  H: NEGATIVE for bleeding problems         Physical Exam:   Blood pressure (!) 143/87, pulse 68, temperature 97.8  F (36.6  C), temperature source Oral, resp. rate 9, height 1.89 m (6'  "2.4\"), weight 90.3 kg (199 lb), SpO2 96 %.  I/O last 3 completed shifts:  In: 1000 [IV Piggyback:1000]  Out: -     General - This is a well developed, well nourished male .  HEENT - Normocephalic. Atraumatic. Moist mucous membranes. Pupils equal.  No scleral icterus. Nose normal.  Neck - Supple without masses. No cervical adenopathy or thyromegaly  Lungs - Breathing not labored  Chest - Not tender. CVA's nontender  Heart - Regular rate & rhythm   Abdomen - Soft, nontender except in epigastrum, nondistended with +bowel sounds, no organomegaly.  Extremities - Moves all extremities. Warm without edema. Pulses noted  Neurologic - Nonfocal. Alert and oriented          Data:   Labs:  Recent Labs   Lab Test 09/21/19 0957 07/05/19  0928   WBC 10.9 6.4   HGB 15.9 15.2   HCT 46.9 46.5    207     Recent Labs   Lab Test 09/21/19 0957 07/05/19  0928   POTASSIUM 3.9 4.4   CHLORIDE 104 105   CO2 31 26   BUN 15 11   CR 0.80 0.82     Recent Labs   Lab Test 09/21/19 0957 07/09/19  1101 07/05/19  0928   BILITOTAL 0.6 0.6 0.5   * 115* 112*   * 49* 48*   ALKPHOS 291* 218* 203*   LIPASE 104  --   --      No lab results found.  Recent Labs   Lab Test 09/21/19 0957 07/05/19  0928   GULSHAN 9.3 8.9     Recent Labs   Lab Test 09/21/19 0957 07/09/19  1101 07/05/19  0928   ANIONGAP 3  --  9   ALBUMIN 4.3 4.0 3.8       CT scan of the abdomen: images reviewed. Basically clear    Ultrasound of the abdomen: gallstones,  Small ones. Wall ok               Assessment:   Recurrent gallbladder attacks         Plan:    lap cholecystectomy. Risks reviewed in detail with patient       I have discussed the history, physical, and plan with the patient.   Nicolas Chandra M.D.       "

## 2019-09-21 NOTE — ED PROVIDER NOTES
History     Chief Complaint:  Abdominal Pain    HPI   Raudel Duran is a 28 year old male with a history of GERD who presents to the emergency department for evaluation of abdominal pain. Of note, the patient had an EGD performed on 7/25, details below. He also had a US of his abdomen performed on 7/9, details below. The patient reports he was at baseline last night when he went to bed, but this morning around 0900 had sudden onset of epigastric abdominal pain and nausea. He rates the pain as a 10/10. He states he did have a normal bowel movement today. He indicates he has had 2 previous similar episodes of symptoms, though the pain is more intense today. The patient remarks he last ate around 0600 and had no pain following that meal. He denies any vomiting.    ESOPHAGOGASTRODUODENOSCOPY 7/25/2018  [facility]  Result Narrative   Impression:         - Normal examined duodenum. Biopsied.                      - Normal stomach.                      - Esophageal mucosal changes were present, including                       congestion (edema), friability, granularity and aphthous                       ulcerations. Findings are suggestive of reflux                       inflammation. Biopsied.                      - Normal mucosa was found in the middle third of the                       esophagus. Biopsied to assess for EoE.    Recommendation:     - Await pathology results.                      - Use Prilosec (omeprazole) 20 mg PO daily.                      - Repeat upper endoscopy in 2 months to check healing.  DANYEL Bennett MD  7/25/2018 12:19:29 PM     US ABDOMEN LIMITED   7/9/2019 11:08 AM                                                           IMPRESSION:    1. Suggestion of fatty liver.  2. Gallbladder sludge in the gallbladder lumen.  3. Hazy echogenicity of the extrahepatic biliary tree that may  represent very small stones versus gallbladder sludge in the  extrahepatic biliary tree. The  "common duct is 0.8 cm, mildly enlarged.  Suggest further assessment.  ANGELINA HEWITT MD    Allergies:  NKDA     Medications:    Zantac  Zyrtec  Singulair  Omeprazole    Past Medical History:    GERD  Asthma    Past Surgical History:    EGD combined    Family History:    HTN  Cerebrovascular disease    Social History:  Presents alone.  Never smoker.  Positive for alcohol use.   Marital Status:  Single [1]     Review of Systems   Gastrointestinal: Positive for abdominal pain and nausea. Negative for vomiting.   All other systems reviewed and are negative.    Physical Exam     Patient Vitals for the past 24 hrs:   BP Temp Temp src Pulse Heart Rate Resp SpO2 Height Weight   09/21/19 1300 (!) 173/99 -- -- 61 68 12 100 % -- --   09/21/19 1230 (!) 160/102 -- -- 59 -- -- 98 % -- --   09/21/19 1103 (!) 153/94 -- -- 63 67 17 96 % -- --   09/21/19 1100 (!) 153/94 -- -- 63 68 16 96 % -- --   09/21/19 1030 (!) 146/99 -- -- 53 63 19 96 % -- --   09/21/19 1015 -- -- -- -- 62 15 97 % -- --   09/21/19 1000 (!) 152/93 -- -- -- -- -- 100 % -- --   09/21/19 0931 (!) 141/86 97.8  F (36.6  C) Oral 73 -- 16 100 % 1.89 m (6' 2.4\") 90.3 kg (199 lb)     Physical Exam  VS: Reviewed per above  HENT: Mucous membranes moist  EYES: sclera anicteric  CV: Rate as noted, regular rhythm.   RESP: Effort normal. Breath sounds are normal bilaterally.  GI: mild epigatric tenderness/rebound/guarding, not distended.   NEURO: Alert, moving all extremities  MSK: No deformity of the extremities  SKIN: Warm and dry    Emergency Department Course     Imaging:  Radiographic findings were communicated with the patient who voiced understanding of the findings.    US Abdomen, Limited (RUQ only):  1.  Mildly dilated common bile duct measuring up to 8 mm. No  visualized choledocholithiasis seen.  2.  Scattered areas of focal fatty infiltration in the liver.  3.  Distended and sludge-filled gallbladder.  As per radiology.    CT Abdomen/Pelvis with contrast:  1. Distended " gallbladder. Gallbladder is otherwise unremarkable. No  definite inflammatory changes are seen around the gallbladder,  although that would be difficult to exclude.  2. Mild intrahepatic biliary ductal dilatation and extrahepatic  _______ and extrapancreatic biliary ductal dilatation without evidence  for mass in head of pancreas or choledocholithiasis. No definite  cholelithiasis is seen.  3. No other etiology for patient's symptoms is identified.  As per radiology.    Laboratory:  CBC: WBC: 10.9, HGB: 15.9, PLT: 257  CMP: Glucose 120 (H), Alkaline Phosphatase 291 (H),  (H),  (H), o/w WNL (Creatinine: 0.80)  Lipase: 104    Interventions:    0949 GI Cocktail 30 mL PO  0956 NS 1L IV Bolus  1000 Dilaudid 0.5 mg IV  1040 Dilaudid 0.5 mg IV  1042 NS 1L IV infusion  1110 Zofran 4 mg IV  1302 Toradol 15 mg IV  Medications   0.9% sodium chloride BOLUS (0 mLs Intravenous Stopped 9/21/19 1041)     Followed by   sodium chloride 0.9% infusion (1,000 mLs Intravenous New Bag 9/21/19 1042)   famotidine (PEPCID) injection 20 mg (20 mg Intravenous Not Given 9/21/19 1007)   HYDROmorphone (PF) (DILAUDID) injection 0.5 mg (0.5 mg Intravenous Given 9/21/19 1040)   ondansetron (ZOFRAN) injection 4 mg (4 mg Intravenous Given 9/21/19 1110)   lidocaine (XYLOCAINE) 2 % 15 mL, alum & mag hydroxide-simethicone (MYLANTA ES/MAALOX  ES) 15 mL GI Cocktail (30 mLs Oral Given 9/21/19 0949)   ketorolac (TORADOL) injection 15 mg (15 mg Intravenous Given 9/21/19 1302)   Saline flush (70 mLs Intravenous Given 9/21/19 1316)   iopamidol (ISOVUE-370) solution 100 mL (100 mLs Intravenous Given 9/21/19 1315)       Emergency Department Course:  Nursing notes and vitals reviewed. 0943 I performed an exam of the patient as documented above.     IV inserted. Medicine administered as documented above. Blood drawn. This was sent to the lab for further testing, results above.    The patient was sent for a US Abdomen, CT Abdomen while in the emergency  "department, findings above.     1215 I rechecked the patient and discussed the results of his workup thus far.   230 Discussed with Dr Chandra, general surgery  2:43 Discussed with Dr Bates who will admit patient primarily.      Impression & Plan      Medical Decision Making:  Patient presents to the ER for evaluation of upper abdominal pain.  Per chart review he has a history of reflux esophageal mucosal changes on EGD from 7/18 as well as abnormal gallbladder ultrasound on 7/19 that revealed gallbladder sludge as well as dilated common bile duct at 0.8 cm as well as hazy echogenicities of the extrahepatic biliary tree that could represent very small stones.  Labs without leukocytosis.  There is no evidence of pancreatitis.  There is transaminitis and alkaline phosphatase elevation which is slightly higher than prior.  No bili elevation to suggest obvious obstructive pattern.  Patient did not achieve pain relief with GI cocktail, famotidine, Dilaudid, toradol.  Repeat gallbladder ultrasound again reveals a dilated common bile duct but no visualized choledocholithiasis.  There was a distended sludge filled gallbladder but no evidence of cholecystitis.  Given ongoing symptoms, proceeded with CT scan of the abdomen.  This study also revealed distended gallbladder. Gallbladder is otherwise unremarkable. No definite inflammatory changes are seen around the gallbladder, \"although that would be difficult to exclude\". There was mild intrahepatic biliary ductal dilatation and extrahepatic/extrapancreatic biliary ductal dilatation without evidence  for mass in head of pancreas or choledocholithiasis. No definite cholelithiasis is seen.  Discussed with general surgery and based on clinical picture, felt pain was due to gallbladder and could likely do cholecystectomy today or tomorrow. Patient admitted to Dr Bates.    Diagnosis:    ICD-10-CM    1. Gallbladder sludge K82.8    2. Common bile duct dilatation K83.8    3. Abdominal " pain, epigastric R10.13        Disposition:  Admitted to Dr Bates    Discharge Medications:  New Prescriptions    No medications on file     I, Jaime Bourne, am serving as a scribe on 9/21/2019 at 9:33 AM to personally document services performed by Jean Charles MD based on my observations and the provider's statements to me.     Jaime Bourne  9/21/2019    EMERGENCY DEPARTMENT       Jean Charles MD  09/21/19 1857

## 2019-09-22 NOTE — OR NURSING
Patient received his watch and backpack. Discharge instructions was given to parents. No questions or concerns.

## 2019-09-22 NOTE — DISCHARGE INSTRUCTIONS
Same Day Surgery Discharge Instructions for  Sedation and General Anesthesia       It's not unusual to feel dizzy, light-headed or faint for up to 24 hours after surgery or while taking pain medication.  If you have these symptoms: sit for a few minutes before standing and have someone assist you when you get up to walk or use the bathroom.      You should rest and relax for the next 24 hours. We recommend you make arrangements to have an adult stay with you for at least 24 hours after your discharge.  Avoid hazardous and strenuous activity.      DO NOT DRIVE any vehicle or operate mechanical equipment for 24 hours following the end of your surgery.  Even though you may feel normal, your reactions may be affected by the medication you have received.      Do not drink alcoholic beverages for 24 hours following surgery.       Slowly progress to your regular diet as you feel able. It's not unusual to feel nauseated and/or vomit after receiving anesthesia.  If you develop these symptoms, drink clear liquids (apple juice, ginger ale, broth, 7-up, etc. ) until you feel better.  If your nausea and vomiting persists for 24 hours, please notify your surgeon.        All narcotic pain medications, along with inactivity and anesthesia, can cause constipation. Drinking plenty of liquids and increasing fiber intake will help.      For any questions of a medical nature, call your surgeon.      Do not make important decisions for 24 hours.      If you had general anesthesia, you may have a sore throat for a couple of days related to the breathing tube used during surgery.  You may use Cepacol lozenges to help with this discomfort.  If it worsens or if you develop a fever, contact your surgeon.       If you feel your pain is not well managed with the pain medications prescribed by your surgeon, please contact your surgeon's office to let them know so they can address your concerns.     [facility]  Discharge Instructions: Post-Operative Laparoscopic Cholecystectomy    ACTIVITY    Expect to feel tired after your surgery.  This will gradually resolve.    Take frequent, short walks and increase your activity gradually.      Avoid strenuous physical activity or heavy lifting greater than 15-20 lbs. for 2-3 weeks.  You may climb stairs.    You may drive without restrictions when you are not using any prescription pain medication and comfortable in a car.    You may return to work/school when you are comfortable without any prescription pain medication.    WOUND CARE    You may remove your outer dressing or Band-Aids and shower 48 hours after the surgery.  Pat your incisions dry and leave open to air.  Re-apply dressing (Band-aid or gauze/tape) as needed for comfort or drainage.    You may have steri-strips (looks like white tape) on your incision.  You may peel off the steri-strip 2 weeks after surgery if they have not peeled off on their own.    Do not soak your incisions in a tub or pool for 2 weeks.     Do not apply any lotions, creams, or ointments to your incisions.    A ridge under incisions is normal and will gradually resolve.    DIET    Start with liquids, then gradually resume your regular diet as tolerated.  Avoid heavy, spicy, and greasy meals for 2-3 days.    Drink plenty of liquids to stay hydrated.     It is not uncommon to experience some loose stools or diarrhea after surgery.  This is your body s way of adapting to the bile which will slowly drain into your intestine. A low fat diet may help with this.  This should improve over 1-2 months.     PAIN    Expect some tenderness and discomfort at the incision sites.  Use the prescribed pain medication at your discretion.  Expect gradual resolution of your pain over several days.    You may take ibuprofen with food (unless you have been told not to) instead of or in addition to your prescribed pain medication.  If you are taking Norco or  Percocet, do not take any additional acetaminophen/APAP/Tylenol.    Do not drink alcohol or drive while you are taking pain medications.    You may apply ice to your incisions in 20 minute intervals as needed for the next 48 hours.  After that time, consider switching to heat if you prefer.    EXPECTATIONS    Pain medications can cause constipation.  Limit use when possible.  Take over the counter stool softener/stimulant, such as Colace or Senna, 1-2 times a day with plenty of water.  You may take a mild over the counter laxative, such as Miralax or a suppository, as needed.  You may discontinue these medications once you are having regular bowel movements and/or are no longer taking your narcotic pain medication.      You may have shoulder or upper back discomfort due to the gas used in surgery.  This is temporary and should resolve in 48-72 hours.  Short, frequent walks may help with this.      FOLLOW UP    Our office will contact you approximately 2 weeks to check on your progress and answer any questions you may have.  If you are doing well, you will not need to return for a follow up appointment.  If any concerns are identified over the phone, we will help you make an appointment to see a provider.    CALL OUR OFFICE IF YOU HAVE:     Chills or fever above 101.5 F.    Increased redness or drainage at your incisions.    Significant bleeding.    Pain not relieved by your pain medication or rest.    Increasing pain after the first 48 hours.    Any other concerns or questions.      Revised January 2018    Today you received Toradol, an antiinflammatory medication similar to Ibuprofen.  You should not take other antiinflammatory medication, such as Ibuprofen, Motrin, Advil, Aleve, Naprosyn, etc until 7 pm

## 2019-09-22 NOTE — ANESTHESIA POSTPROCEDURE EVALUATION
Patient: Raudel Duran    Procedure(s):  CHOLECYSTECTOMY, LAPAROSCOPIC    Diagnosis:unknown  Diagnosis Additional Information: No value filed.    Anesthesia Type:  General, RSI, ETT    Note:  Anesthesia Post Evaluation    Patient location during evaluation: PACU and Phase 2  Patient participation: Able to fully participate in evaluation  Level of consciousness: awake and alert  Pain management: satisfactory to patient  Airway patency: patent  Cardiovascular status: acceptable and hemodynamically stable  Respiratory status: acceptable and unassisted  Hydration status: acceptable  PONV: none             Last vitals:  Vitals:    09/21/19 1830 09/21/19 1840 09/21/19 1912   BP: (!) 158/90 (!) 159/81 (!) 169/90   Pulse: 93 94 93   Resp: 14 15 14   Temp: 36.6  C (97.9  F) 36.7  C (98.1  F) 36.6  C (97.9  F)   SpO2: 95% 97% 97%         Electronically Signed By: Piter Tate DO  September 21, 2019  8:22 PM

## 2019-09-24 LAB — COPATH REPORT: NORMAL

## 2019-10-01 ENCOUNTER — MYC MEDICAL ADVICE (OUTPATIENT)
Dept: INTERNAL MEDICINE | Facility: CLINIC | Age: 29
End: 2019-10-01

## 2019-10-01 DIAGNOSIS — K21.00 GASTROESOPHAGEAL REFLUX DISEASE WITH ESOPHAGITIS: Primary | ICD-10-CM

## 2019-10-01 NOTE — TELEPHONE ENCOUNTER
See mychart message. Please advise.     Last OV 7/5/19    Pt had recent Cholecystectomy. History of Esophagitis.

## 2019-10-21 ENCOUNTER — HOSPITAL ENCOUNTER (OUTPATIENT)
Facility: CLINIC | Age: 29
Discharge: HOME | End: 2019-10-21
Attending: INTERNAL MEDICINE | Admitting: INTERNAL MEDICINE
Payer: COMMERCIAL

## 2019-10-21 VITALS
SYSTOLIC BLOOD PRESSURE: 132 MMHG | WEIGHT: 199 LBS | OXYGEN SATURATION: 94 % | DIASTOLIC BLOOD PRESSURE: 84 MMHG | HEIGHT: 74 IN | RESPIRATION RATE: 10 BRPM | HEART RATE: 77 BPM | BODY MASS INDEX: 25.54 KG/M2

## 2019-10-21 LAB — UPPER GI ENDOSCOPY: NORMAL

## 2019-10-21 PROCEDURE — 88305 TISSUE EXAM BY PATHOLOGIST: CPT | Performed by: INTERNAL MEDICINE

## 2019-10-21 PROCEDURE — 43239 EGD BIOPSY SINGLE/MULTIPLE: CPT | Performed by: INTERNAL MEDICINE

## 2019-10-21 PROCEDURE — 25000128 H RX IP 250 OP 636: Performed by: INTERNAL MEDICINE

## 2019-10-21 PROCEDURE — 88305 TISSUE EXAM BY PATHOLOGIST: CPT | Mod: 26 | Performed by: INTERNAL MEDICINE

## 2019-10-21 PROCEDURE — G0500 MOD SEDAT ENDO SERVICE >5YRS: HCPCS | Performed by: INTERNAL MEDICINE

## 2019-10-21 PROCEDURE — 25000125 ZZHC RX 250: Performed by: INTERNAL MEDICINE

## 2019-10-21 RX ORDER — ONDANSETRON 2 MG/ML
4 INJECTION INTRAMUSCULAR; INTRAVENOUS
Status: DISCONTINUED | OUTPATIENT
Start: 2019-10-21 | End: 2019-10-21 | Stop reason: HOSPADM

## 2019-10-21 RX ORDER — LIDOCAINE 40 MG/G
CREAM TOPICAL
Status: DISCONTINUED | OUTPATIENT
Start: 2019-10-21 | End: 2019-10-21 | Stop reason: HOSPADM

## 2019-10-21 RX ORDER — NALOXONE HYDROCHLORIDE 0.4 MG/ML
.1-.4 INJECTION, SOLUTION INTRAMUSCULAR; INTRAVENOUS; SUBCUTANEOUS
Status: DISCONTINUED | OUTPATIENT
Start: 2019-10-21 | End: 2019-10-21 | Stop reason: HOSPADM

## 2019-10-21 RX ORDER — ONDANSETRON 2 MG/ML
4 INJECTION INTRAMUSCULAR; INTRAVENOUS EVERY 6 HOURS PRN
Status: DISCONTINUED | OUTPATIENT
Start: 2019-10-21 | End: 2019-10-21 | Stop reason: HOSPADM

## 2019-10-21 RX ORDER — FLUMAZENIL 0.1 MG/ML
0.2 INJECTION, SOLUTION INTRAVENOUS
Status: DISCONTINUED | OUTPATIENT
Start: 2019-10-21 | End: 2019-10-21 | Stop reason: HOSPADM

## 2019-10-21 RX ORDER — ONDANSETRON 4 MG/1
4 TABLET, ORALLY DISINTEGRATING ORAL EVERY 6 HOURS PRN
Status: DISCONTINUED | OUTPATIENT
Start: 2019-10-21 | End: 2019-10-21 | Stop reason: HOSPADM

## 2019-10-21 RX ORDER — FENTANYL CITRATE 50 UG/ML
INJECTION, SOLUTION INTRAMUSCULAR; INTRAVENOUS PRN
Status: DISCONTINUED | OUTPATIENT
Start: 2019-10-21 | End: 2019-10-21 | Stop reason: HOSPADM

## 2019-10-21 ASSESSMENT — MIFFLIN-ST. JEOR: SCORE: 1937.41

## 2019-10-21 NOTE — PROCEDURES
PRE-PROCEDURE H&P    CHIEF COMPLAINT / REASON FOR PROCEDURE:  Follow up esophagitis    PERTINENT HISTORY :    History reviewed. No pertinent past medical history.   Past Surgical History:   Procedure Laterality Date     ESOPHAGOSCOPY, GASTROSCOPY, DUODENOSCOPY (EGD), COMBINED  2017    hx Sen's Esophagus hx ulcers in [place]     ESOPHAGOSCOPY, GASTROSCOPY, DUODENOSCOPY (EGD), COMBINED  10/21/57587    Dr. Granados [facility]     LAPAROSCOPIC CHOLECYSTECTOMY N/A 9/21/2019    Procedure: CHOLECYSTECTOMY, LAPAROSCOPIC;  Surgeon: Nicolas Chandra MD;  Location: [facility] OR         Bleeding tendencies:  No    Relevant Family History:  NONE     Relevant Social History:  NONE      A relevant review of systems was performed and was negative      ALLERGIES/SENSITIVITIES:   Allergies   Allergen Reactions     Seasonal Allergies      Spring and summer       CURRENT MEDICATIONS:   No current outpatient medications on file.        PRE-SEDATION ASSESSMENT:    Lung Exam:  normal  Heart Exam:  normal  Airway Exam: normal  Previous reaction to anesthesia/sedation:   No  Sedation plan based on assessment: Moderate (conscious) sedation  ASA Classification:  1 - Healthy patient, no medical problems        IMPRESSION:  Follow up esophagitis    PLAN:  EGD    Regina Granados MD  [facility]  Office: [phone]

## 2019-10-21 NOTE — LETTER
October 6, 2019      Raudel Duran  [address] [place] [id number]        Dear Raudel,         Thank you for choosing [facility]. You are scheduled for the following service(s).   Please be aware that coverage of these services is subject to the terms and limitations of your health insurance plan.  Call member services at your health plan with any benefit or coverage questions.    Date:   10/21/19      Procedure: UPPER ENDOSCOPY-EGD  Doctor: Darwin           Arrival Time:  0930    *check in at Emergency/Endoscopy desk*  Procedure Time: 1000       Location:   [facility]        Endoscopy Department, First Floor (Enter through ER Doors) *         [address] [place], [place] [address]      [phone] or [phone] (Rosalinda) to reschedule          PRE-PROCEDURE CHECKLIST    If you have diabetes, ask your regular doctor for diet and medication restrictions.  If you take any antiplatelet or anticoagulant medications (such as Coumadin, Lovenox, Plavix, etc.) and have not already discussed this, please call your primary physician for advice on holding this medication.  If you take Aspirin, you may continue to do so.  If you are or may be pregnant, please discuss the risks and benefits of this procedure with your doctor.  You must arrange for a ride for the day of your exam. If you fail to arrange transportation with a responsible adult, your procedure will need to be cancelled and rescheduled. Taxi, bus and medical transport are not acceptable unless you have a responsible adult that you know & trust with you. Please arrange for this  to be able to pick you up in our department, approximately one hour after your scheduled procedure, if they are not able to stay with you.      Canceling or rescheduling   If you must cancel or reschedule your appointment, please call [phone] as soon as possible.      Upper Endoscopy or  Esophagogastroduodenoscopy (EGD) is a test performed to evaluate symptoms of persistent abdominal pain, nausea, vomiting and difficulty swallowing. It may also be used to treat various conditions of the upper gastrointestinal tract, such as bleeding, narrowing or abnormal growths.     What happens during an upper endoscopy?  On the day of your procedure, plan to spend up to one and a half hour after your arrival at the endoscopy center. The exam itself takes about 5 to 10 minutes.    Before the exam:  - You will change into a gown.   - Your medical history and medication list will be reviewed with you, unless it has already been done over the phone.   - A nurse will insert an intravenous (IV) line into your hand or arm.  - The doctor will talk to you and give you a consent form to sign.    During the exam:  - Medicine will be given through the IV line to help you relax and feel comfortable.   - Your heart rate and oxygen levels will be monitored. If your blood pressure is low, you may be given fluids through the IV line.   - The doctor will insert a flexible, hollow tube, called an endoscope, into your mouth and will advance it slowly through the esophagus, stomach and duodenum (the first part of your small intestine).   - You may have a feeling of pressure or fullness.   - If you have difficulty swallowing, and the doctor finds a narrowing in your esophagus, it may be possible for the area to be expanded-dilated during the exam.   - If abnormal tissue is found, the doctor may remove it through the endoscope (biopsy it) for closer examination. The tissue removal is painless.    After the exam:  - Any tissue samples removed during the exam will be sent to a lab for evaluation. It may take 5 to 7 working days for you to be notified of the results  - The doctor will prepare a full report for the physician who referred you for the upper endoscopy.   - The doctor will talk with you about the initial results of your exam.    - You may feel bloated after the procedure. That is normal and should not last long.   - Your throat may feel sore for a short time.   - Following the exam, you may resume your normal diet. Avoid alcohol until the next day.   - You may resume your regular activities the day after the procedure.   - Medication given during the exam will prohibit you from driving for the rest of the day.  - A nurse will provide you with complete discharge instructions before you leave the endoscopy center. Be sure to ask the nurse for specific instructions if you take blood thinners such as Aspirin , Coumadin , Lovenox , Plavix , etc.       PREPARATION    To ensure a successful exam, please follow all instructions carefully.      The night before your exam:    STOP eating solid foods at 11:45 pm.     Clear liquids are okay to drink (examples: Gatorade , apple juice, clear broth,coffee or tea without milk or cream, etc.).     DO NOT drink red liquids or alcoholic beverages.    The day of your exam:    STOP drinking clear liquids 4 hours before your exam.     You may take your usual medications with 4 oz. of water, but it needs to be at least 4 hours prior to your procedure.    When you leave for the procedure:    Bring a list of all of your current medications, including any allergy or over-the-counter medications, unless you have already reviewed that with an Endoscopy RN over the phone.     Bring a photo ID as well as up-to-date insurance information, such as your insurance card and any referral forms that might be required by your payer.         DIRECTIONS TO THE ENDOSCOPY DEPARTMENT    From the north ([facility])  Take [address]W south, exit on [address] . Get into the left hand jodie, turn left (east), go one-half mile to [address] and turn left. Go north to the first stoplight, take a right on [address] and follow it to the Emergency entrance.    From the south ([facility])  Take [address]N  to the [address]E split and exit on [address] . On [address] , turn left (west) to [address]. Turn right (north) on [address]. Go north to the first stoplight, take a right on [address] and follow it to the Emergency entrance.    From the east via 35E ([facility])  Take [address]E south to [place] [address] exit. Turn right on [address] . Go west to [address]. Turn right (north) on [address]. Go to the first stoplight, take a right and follow on [address] to the Emergency entrance.    From the east via [address] ([facility])  Take [address] to [address]. Turn left (south) on [address] to [address]. Turn left (east) on [address] and follow it to the Emergency entrance.    From the west via [address] ([place])  Take [address] east to [address]. Turn right (south) on [address] to [address]. Turn left (east) on [address] and follow it to the Emergency entrance.

## 2019-10-22 LAB — COPATH REPORT: NORMAL

## 2019-10-23 ENCOUNTER — OFFICE VISIT (OUTPATIENT)
Dept: SURGERY | Facility: CLINIC | Age: 29
End: 2019-10-23
Payer: COMMERCIAL

## 2019-10-23 DIAGNOSIS — Z09 SURGERY FOLLOW-UP EXAMINATION: Primary | ICD-10-CM

## 2019-10-23 PROCEDURE — 99024 POSTOP FOLLOW-UP VISIT: CPT | Performed by: SURGERY

## 2019-10-23 NOTE — PROGRESS NOTES
Doing well after operation. Pathology reviewed. Wounds OK, discussed restrictions, all questions answered.  Plan:  call PRN    Copy to PCP    Nicolas Chandra MD

## 2019-12-06 ENCOUNTER — MYC MEDICAL ADVICE (OUTPATIENT)
Dept: INTERNAL MEDICINE | Facility: CLINIC | Age: 29
End: 2019-12-06

## 2019-12-30 ENCOUNTER — OFFICE VISIT (OUTPATIENT)
Dept: INTERNAL MEDICINE | Facility: CLINIC | Age: 29
End: 2019-12-30
Payer: COMMERCIAL

## 2019-12-30 VITALS
BODY MASS INDEX: 26.82 KG/M2 | HEIGHT: 74 IN | OXYGEN SATURATION: 98 % | HEART RATE: 80 BPM | DIASTOLIC BLOOD PRESSURE: 80 MMHG | SYSTOLIC BLOOD PRESSURE: 120 MMHG | RESPIRATION RATE: 18 BRPM | TEMPERATURE: 97.9 F | WEIGHT: 209 LBS

## 2019-12-30 DIAGNOSIS — R74.8 ELEVATED LIVER ENZYMES: ICD-10-CM

## 2019-12-30 DIAGNOSIS — B07.8 OTHER VIRAL WARTS: ICD-10-CM

## 2019-12-30 DIAGNOSIS — Z87.19 HISTORY OF CALCULUS OF GALLBLADDER: ICD-10-CM

## 2019-12-30 DIAGNOSIS — S76.311S STRAIN OF RIGHT HAMSTRING MUSCLE, SEQUELA: Primary | ICD-10-CM

## 2019-12-30 PROCEDURE — 36415 COLL VENOUS BLD VENIPUNCTURE: CPT | Performed by: INTERNAL MEDICINE

## 2019-12-30 PROCEDURE — 99214 OFFICE O/P EST MOD 30 MIN: CPT | Mod: 25 | Performed by: INTERNAL MEDICINE

## 2019-12-30 PROCEDURE — 80053 COMPREHEN METABOLIC PANEL: CPT | Performed by: INTERNAL MEDICINE

## 2019-12-30 PROCEDURE — 17110 DESTRUCTION B9 LES UP TO 14: CPT | Performed by: INTERNAL MEDICINE

## 2019-12-30 ASSESSMENT — ENCOUNTER SYMPTOMS
RESPIRATORY NEGATIVE: 1
FATIGUE: 0
ROS SKIN COMMENTS: WARTS
GASTROINTESTINAL NEGATIVE: 1
CARDIOVASCULAR NEGATIVE: 1
MYALGIAS: 1
FEVER: 0
TROUBLE SWALLOWING: 0
EYES NEGATIVE: 1
ARTHRALGIAS: 1
PSYCHIATRIC NEGATIVE: 1

## 2019-12-30 ASSESSMENT — MIFFLIN-ST. JEOR: SCORE: 1982.77

## 2019-12-30 NOTE — PATIENT INSTRUCTIONS
Patient Education     What Are Warts?    Warts are common skin growths that can appear anywhere on the body. There are many types of warts. In most cases, they are benign (not cancer) and harmless. But warts can be embarrassing. And some warts are painful. The good news is that they can be treated.  Who gets warts?  Warts are most common in children and teens. But they can occur at any age. They are also more common in certain jobs, such as those that involve handling meat, poultry, or fish. A weakened immune system may make you more likely to have warts.  What causes warts?  Warts are caused by the human papillomavirus (HPV). There are over 150 types of HPV. This virus can spread between people. But you can be exposed to the virus and not get warts. Warts tend to form where skin is damaged or broken. But they can also appear elsewhere. Left untreated, warts can grow in number. They can also spread to other parts of the body.  Types of warts  There are many types of warts. Some of the most common ones are described below:    Common warts. These have a raised, rough surface. Enlarged blood vessels in the warts look like dots on the warts  surface. Common warts form mainly on the hands, but can appear on other parts of the body.    Plantar warts. These are warts on the soles of the feet. When you stand or walk, pressure makes plantar warts painful. When they form in clusters, plantar warts are called mosaic warts.    Periungual warts. These form under and around fingernails. People who bite their nails are more at risk.    Filiform warts. These are slender, fingerlike growths that can dangle from the skin. They most often appear on the face and neck.    Flat warts. These are small, smooth growths. They tend to form in clusters on the face, backs of the hands, or legs.    Genital warts. These can appear on or around the genitals. These warts can spread and are linked to cervical, anal, and other cancers. So it is  important to have them treated quickly and to discuss these with sexual partners.  Date Last Reviewed: 2/1/2017 2000-2018 [employer] [address] [place] [address], [place] [address]. All rights reserved. This information is not intended as a substitute for professional medical care. Always follow your healthcare professional's instructions.

## 2019-12-30 NOTE — LETTER
January 2, 2020      Raudel Duran  [address] [place] [id number]        Dear ,    We are writing to inform you of your test results.    Liver enzymes are still high, but better than September labs, will recheck in 1 month to see if its trending down or he needs further testing, please do after Jan 31 2020     Resulted Orders   Comprehensive metabolic panel (BMP + Alb, Alk Phos, ALT, AST, Total. Bili, TP)   Result Value Ref Range    Sodium 139 133 - 144 mmol/L    Potassium 4.4 3.4 - 5.3 mmol/L    Chloride 106 94 - 109 mmol/L    Carbon Dioxide 28 20 - 32 mmol/L    Anion Gap 5 3 - 14 mmol/L    Glucose 97 70 - 99 mg/dL      Comment:      Non Fasting    Urea Nitrogen 13 7 - 30 mg/dL    Creatinine 0.93 0.66 - 1.25 mg/dL    GFR Estimate >90 >60 mL/min/[1.73_m2]      Comment:      Non  GFR Calc  Starting 12/18/2018, serum creatinine based estimated GFR (eGFR) will be   calculated using the Chronic Kidney Disease Epidemiology Collaboration   (CKD-EPI) equation.      GFR Estimate If Black >90 >60 mL/min/[1.73_m2]      Comment:       GFR Calc  Starting 12/18/2018, serum creatinine based estimated GFR (eGFR) will be   calculated using the Chronic Kidney Disease Epidemiology Collaboration   (CKD-EPI) equation.      Calcium 8.9 8.5 - 10.1 mg/dL    Bilirubin Total 0.3 0.2 - 1.3 mg/dL    Albumin 3.9 3.4 - 5.0 g/dL    Protein Total 7.5 6.8 - 8.8 g/dL    Alkaline Phosphatase 217 (H) 40 - 150 U/L     (H) 0 - 70 U/L    AST 51 (H) 0 - 45 U/L       If you have any questions or concerns, please call the clinic at the number listed above.       Sincerely,        John Silverman MD

## 2019-12-30 NOTE — NURSING NOTE
"/80 (BP Location: Right arm, Patient Position: Sitting, Cuff Size: Adult Large)   Pulse 80   Temp 97.9  F (36.6  C) (Oral)   Resp 18   Ht 1.88 m (6' 2\")   Wt 94.8 kg (209 lb)   SpO2 98%   BMI 26.83 kg/m    Nae Dyer CMA    "

## 2019-12-30 NOTE — PROGRESS NOTES
Subjective     Raudel Duran is a 29 year old male who presents to clinic today for the following health issues:    HPI   Patient has remote history of right hamstring strain/injury when he played football.  Intermittently when he squats it is bothering and has difficulty doing exercises.  Patient wanted to go for physical therapy to help with that.    Patient has multiple warts in both hands, that includes 4 warts on the palmar surface of the left hand and one in the right index finger.  Cryotherapy with liquid nitrogen in July 2019 was advised to follow-up if it is not getting better.  Patient thought it improved but all the warts are persisting and was wondering if he could get 1 more round of cryotherapy.    Patient had elevated liver enzymes with gallbladder sludge with common bile dilatation and went to ER.  Patient had his gallbladder removed in September.  Patient did not have any follow-up labs after that and wanted his liver enzymes checked.    There is no problem list on file for this patient.    Past Surgical History:   Procedure Laterality Date     ESOPHAGOSCOPY, GASTROSCOPY, DUODENOSCOPY (EGD), COMBINED  2017    hx Sen's Esophagus hx ulcers in [place]     ESOPHAGOSCOPY, GASTROSCOPY, DUODENOSCOPY (EGD), COMBINED  10/21/72542    Dr. Granados [facility]     ESOPHAGOSCOPY, GASTROSCOPY, DUODENOSCOPY (EGD), COMBINED N/A 10/21/2019    Procedure: ESOPHAGOGASTRODUODENOSCOPY, WITH BIOPSY with biopsies by cold forceps;  Surgeon: Regina Granados MD;  Location:  GI     LAPAROSCOPIC CHOLECYSTECTOMY N/A 9/21/2019    Procedure: CHOLECYSTECTOMY, LAPAROSCOPIC;  Surgeon: Nicolas Chandra MD;  Location:  OR       Social History     Tobacco Use     Smoking status: Never Smoker     Smokeless tobacco: Never Used   Substance Use Topics     Alcohol use: Yes     Comment: rarely     Family History   Problem Relation Age of Onset     Hypertension Father      Cerebrovascular Disease Maternal Grandmother      No Known Problems  "Mother      Colon Cancer No family hx of          Current Outpatient Medications   Medication Sig Dispense Refill     omeprazole (PRILOSEC OTC) 20 MG EC tablet Take 20 mg by mouth daily       Allergies   Allergen Reactions     Seasonal Allergies      Spring and summer       Reviewed and updated as needed this visit by Provider  Problems  Med Hx       Review of Systems   Constitutional: Negative for fatigue and fever.   HENT: Negative for congestion and trouble swallowing.    Eyes: Negative.    Respiratory: Negative.    Cardiovascular: Negative.    Gastrointestinal: Negative.    Genitourinary: Negative.    Musculoskeletal: Positive for arthralgias and myalgias.   Skin:        Warts   Psychiatric/Behavioral: Negative.           Objective    /80 (BP Location: Right arm, Patient Position: Sitting, Cuff Size: Adult Large)   Pulse 80   Temp 97.9  F (36.6  C) (Oral)   Resp 18   Ht 1.88 m (6' 2\")   Wt 94.8 kg (209 lb)   SpO2 98%   BMI 26.83 kg/m    Body mass index is 26.83 kg/m .  Physical Exam  Constitutional:       Appearance: Normal appearance.   Musculoskeletal:      Comments: Right knee: No swelling noted.  Right hamstring was not tender to palpate and range of motion was full without any discomfort.   Skin:     General: Skin is warm.      Comments: 4 warts noted in the palmar surface of the right hand and one wart noted in the index finger of the left hand.  Ranging anywhere from 2 mm to 5 mm.   Neurological:      General: No focal deficit present.      Mental Status: He is alert.   Psychiatric:         Mood and Affect: Mood normal.          Assessment & Plan     Raudel was seen today for derm problem and referral.    Diagnoses and all orders for this visit:    Strain of right hamstring muscle, sequela  -     PHYSICAL THERAPY REFERRAL; Future    Other viral warts  -     DESTRUCT BENIGN LESION, UP TO 14    History of calculus of gallbladder  -     Comprehensive metabolic panel (BMP + Alb, Alk Phos, ALT, " AST, Total. Bili, TP)    Elevated liver enzymes  -     Comprehensive metabolic panel (BMP + Alb, Alk Phos, ALT, AST, Total. Bili, TP)      Will refer the patient to physical therapy.  We will do blood work to check the liver enzymes.  Cryotherapy with liquid nitrogen was applied to the warts in the hands.    No follow-ups on file.    Jonh Silverman MD  [facility]

## 2019-12-30 NOTE — PROGRESS NOTES
SUBJECTIVE: 29 year old male needs retreatment of wart(s).  First cryotherapy was in July 5, 2019.    OBJECTIVE: 5 wart(s) noted on the hands. Size ranging is 2-5mm.    ASSESSMENT: Warts (Verruca Vulgaris)    PLAN: Liquid nitrogen was applied to 5 wart(s);  the patient will   return at 2-4 week intervals for retreatments as needed.

## 2019-12-31 ENCOUNTER — THERAPY VISIT (OUTPATIENT)
Dept: PHYSICAL THERAPY | Facility: CLINIC | Age: 29
End: 2019-12-31
Attending: INTERNAL MEDICINE
Payer: COMMERCIAL

## 2019-12-31 DIAGNOSIS — S76.311S STRAIN OF RIGHT HAMSTRING MUSCLE, SEQUELA: ICD-10-CM

## 2019-12-31 DIAGNOSIS — M25.561 RIGHT KNEE PAIN: ICD-10-CM

## 2019-12-31 LAB
ALBUMIN SERPL-MCNC: 3.9 G/DL (ref 3.4–5)
ALP SERPL-CCNC: 217 U/L (ref 40–150)
ALT SERPL W P-5'-P-CCNC: 127 U/L (ref 0–70)
ANION GAP SERPL CALCULATED.3IONS-SCNC: 5 MMOL/L (ref 3–14)
AST SERPL W P-5'-P-CCNC: 51 U/L (ref 0–45)
BILIRUB SERPL-MCNC: 0.3 MG/DL (ref 0.2–1.3)
BUN SERPL-MCNC: 13 MG/DL (ref 7–30)
CALCIUM SERPL-MCNC: 8.9 MG/DL (ref 8.5–10.1)
CHLORIDE SERPL-SCNC: 106 MMOL/L (ref 94–109)
CO2 SERPL-SCNC: 28 MMOL/L (ref 20–32)
CREAT SERPL-MCNC: 0.93 MG/DL (ref 0.66–1.25)
GFR SERPL CREATININE-BSD FRML MDRD: >90 ML/MIN/{1.73_M2}
GLUCOSE SERPL-MCNC: 97 MG/DL (ref 70–99)
POTASSIUM SERPL-SCNC: 4.4 MMOL/L (ref 3.4–5.3)
PROT SERPL-MCNC: 7.5 G/DL (ref 6.8–8.8)
SODIUM SERPL-SCNC: 139 MMOL/L (ref 133–144)

## 2019-12-31 PROCEDURE — 97161 PT EVAL LOW COMPLEX 20 MIN: CPT | Mod: GP | Performed by: PHYSICAL THERAPIST

## 2019-12-31 PROCEDURE — 97110 THERAPEUTIC EXERCISES: CPT | Mod: GP | Performed by: PHYSICAL THERAPIST

## 2019-12-31 PROCEDURE — 97112 NEUROMUSCULAR REEDUCATION: CPT | Mod: GP | Performed by: PHYSICAL THERAPIST

## 2019-12-31 NOTE — PROGRESS NOTES
"[facility] Initial Evaluation -- Lower Extremity    Evaluation Date: December 31, 2019  Raudel Duran is a 29 year old male with a R post thigh condition.   Referral: Dr. Hernández mechanical stresses: Cardiac rehab   Employment status: full time  Leisure mechanical stresses: see flow sheet  Functional disability score: see flow sheet  VAS score (0-10): 4-5/10  Patient goals/expectations:  \"to figure out pain, resolve muscle imbalance\"    HISTORY:    Present symptoms: R medial hamstrin  Pain quality (sharp/shooting/stabbing/aching/burning/cramping):  Aching, tight    Present since (onset date): chronic; worsening December 2019    Symptoms (improving/unchaning/worsening):  unchanging.      Symptoms commenced as a result of: football?   Condition occurred in the following environment:      Symptoms at onset: R distal hamstring insertion  Paresthesia (yes/no):  no  Spinal history: none   Cough/Sneeze (pos/neg):  neg    Constant symptoms:   Intermittent symptoms: R medial knee    Symptoms are worse with the following: Always Squatting   Symptoms are better with the following: Other - rest, takes a few days to fully resolve    Continued use makes the pain (better/worse/no effect): worse    Disturbed night (yes/no): no      Pain at rest (yes/no):  At times  Site (back/hip/knee/ankle/foot):  R knee    Other questions (swelling/clicking/locking/giving way/falling):  negative     Previous episodes: ongoing last 2-3 years  Previous treatments: self management    Specific Questions:  General health (excellent/good/fair/poor):  excellent  Pertinent medical history includes: Overweight  Medications (nil/NSAIDS/analg/steroids/anticoag/other):  None  Medical allergies:  none  Imaging (none/Xray/MRI/other):  none  Recent or major surgery (yes/no):  no  Night pain (yes/no):  no  Accidents (yes/no):  none  Unexplained weight loss (yes/no):  no  Barriers at home: none  Other red flags: none    Sites for physical " examination (back/hip/knee/ankle/foot/other): R knee, R hamstring    EXAMINATION    Posture:  Sitting (good/fair/poor):     Correction of Posture (better/worse/no effect/NA):   Standing (good/fair/poor):   Other observations:      Neurological: (NA/motor/sensory/reflexes/dural):     Baselines (pain or functional activity): squatting    Extremities (Hip / Knee / Ankle / Foot): R knee, R hamstring    Movement Loss Nathan Mod Min Nil Pain   Flexion    x    Extension   x x Erp, lacks full hyperextension   Abduction        Adduction        Internal Rotation        External Rotation        Dorsiflexion        Plantarflexion        Inversion        Eversion          Passive Movement (+/- over pressure)/(PDM/ERP):  +passive knee ext (concordant pain)  Resisted Test Response (pain): 5/5 quads/hams painfree  Other Tests: R hip strength: Glut med=4-/5, Glut Max=4-/5    Spine:  Movement loss:   Effect of repeated movements:   Effect of static positioning:   Spine testing (not relevant/relevant/secondary problem):     Baseline Symptoms: R medial knee  Repeated Tests Symptom Response Mechanical Response   Active/Passive movement, resisted test, functional test During -  Produce, Abolish, Increase, Decrease, NE After -  Better, Worse, NB, NW, NE Effect -   ? or ? ROM, strength or key functional test No   Effect   Active knee extension 3 x 10 No Effect    Better    incr ROM, decr pain with squatting                         Effect of static positioning                Provisional Classification (Extremity/Spine):  Extremity - Derangement      Princicple of Management:   Education:  Therapeutic dose of exercise, self assessment    Equipment provided:    Exercise and dosage:      ASSESSMENT/PLAN:    Patient is a 29 year old male with right side knee complaints.    Patient has the following significant findings with corresponding treatment plan.                Diagnosis 1:  R knee pain  Pain -  manual therapy, self management, education,  directional preference exercise and home program  Decreased ROM/flexibility - manual therapy and therapeutic exercise  Decreased strength - therapeutic exercise and therapeutic activities  Decreased function - therapeutic activities    Therapy Evaluation Codes:   1) History comprised of:   Personal factors that impact the plan of care:      None.    Comorbidity factors that impact the plan of care are:      None.     Medications impacting care: None.  2) Examination of Body Systems comprised of:   Body structures and functions that impact the plan of care:      Knee.   Activity limitations that impact the plan of care are:      Sports and Squatting/kneeling.  3) Clinical presentation characteristics are:   Stable/Uncomplicated.  4) Decision-Making    Low complexity using standardized patient assessment instrument and/or measureable assessment of functional outcome.  Cumulative Therapy Evaluation is: Low complexity.    Previous and current functional limitations:  (See Goal Flow Sheet for this information)    Short term and Long term goals: (See Goal Flow Sheet for this information)     Communication ability:  Patient appears to be able to clearly communicate and understand verbal and written communication and follow directions correctly.  Treatment Explanation - The following has been discussed with the patient:   RX ordered/plan of care  Anticipated outcomes  Possible risks and side effects  This patient would benefit from PT intervention to resume normal activities.   Rehab potential is good.    Frequency:  2 X a month, once daily  Duration:  for 2 months  Discharge Plan:  Achieve all LTG.  Independent in home treatment program.  Reach maximal therapeutic benefit.    Please refer to the daily flowsheet for treatment today, total treatment time and time spent performing 1:1 timed codes.

## 2020-03-17 PROBLEM — M25.561 RIGHT KNEE PAIN: Status: RESOLVED | Noted: 2019-12-31 | Resolved: 2020-03-17

## 2020-06-15 ENCOUNTER — VIRTUAL VISIT (OUTPATIENT)
Dept: INTERNAL MEDICINE | Facility: CLINIC | Age: 30
End: 2020-06-15
Payer: COMMERCIAL

## 2020-06-15 DIAGNOSIS — Z13.6 ENCOUNTER FOR LIPID SCREENING FOR CARDIOVASCULAR DISEASE: ICD-10-CM

## 2020-06-15 DIAGNOSIS — Z13.220 ENCOUNTER FOR LIPID SCREENING FOR CARDIOVASCULAR DISEASE: ICD-10-CM

## 2020-06-15 DIAGNOSIS — Z00.00 ENCOUNTER FOR ANNUAL PHYSICAL EXAM: Primary | ICD-10-CM

## 2020-06-15 PROCEDURE — 99213 OFFICE O/P EST LOW 20 MIN: CPT | Mod: GT | Performed by: INTERNAL MEDICINE

## 2020-06-15 ASSESSMENT — ENCOUNTER SYMPTOMS
ABDOMINAL PAIN: 0
VOMITING: 0
ABDOMINAL DISTENTION: 0
NAUSEA: 0

## 2020-06-15 NOTE — PROGRESS NOTES
"Raudel Duran is a 29 year old male who is being evaluated via a billable video visit.      The patient has been notified of following:     \"This video visit will be conducted via a call between you and your physician/provider. We have found that certain health care needs can be provided without the need for an in-person physical exam.  This service lets us provide the care you need with a video conversation.  If a prescription is necessary we can send it directly to your pharmacy.  If lab work is needed we can place an order for that and you can then stop by our lab to have the test done at a later time.    Video visits are billed at different rates depending on your insurance coverage.  Please reach out to your insurance provider with any questions.    If during the course of the call the physician/provider feels a video visit is not appropriate, you will not be charged for this service.\"    Patient has given verbal consent for Video visit? Yes    Will anyone else be joining your video visit? No    Subjective     Raudel Duran is a 29 year old male who presents today via video visit for the following health issues:    HPI Video Start Time: 11:31 AM    Wants labs orders and some items to discuss with provider but didn't want to disclose to MA.    H/o cholecystectomy in 12/2019 with elevated LFT. Advised follow-up in 1 month to recheck LFT.  , AST 51, .  Patient is also due for physical in July and is fasting and wanted to know if he can do all the blood work today and have a physical in July.      Patient Active Problem List   Diagnosis   (none) - all problems resolved or deleted     Past Surgical History:   Procedure Laterality Date     ESOPHAGOSCOPY, GASTROSCOPY, DUODENOSCOPY (EGD), COMBINED  2017    hx Sen's Esophagus hx ulcers in [place]     ESOPHAGOSCOPY, GASTROSCOPY, DUODENOSCOPY (EGD), COMBINED  10/21/25758    Dr. Darwin [facility]EDRA     ESOPHAGOSCOPY, GASTROSCOPY, DUODENOSCOPY (EGD), COMBINED N/A " "10/21/2019    Procedure: ESOPHAGOGASTRODUODENOSCOPY, WITH BIOPSY with biopsies by cold forceps;  Surgeon: Regina Granados MD;  Location: [facility]     LAPAROSCOPIC CHOLECYSTECTOMY N/A 9/21/2019    Procedure: CHOLECYSTECTOMY, LAPAROSCOPIC;  Surgeon: Nicolas Chandra MD;  Location:  OR       Social History     Tobacco Use     Smoking status: Never Smoker     Smokeless tobacco: Never Used   Substance Use Topics     Alcohol use: Yes     Comment: rarely     Family History   Problem Relation Age of Onset     Hypertension Father      Cerebrovascular Disease Maternal Grandmother      No Known Problems Mother      Colon Cancer No family hx of          Current Outpatient Medications   Medication Sig Dispense Refill     omeprazole (PRILOSEC OTC) 20 MG EC tablet Take 20 mg by mouth daily       Allergies   Allergen Reactions     Seasonal Allergies      Spring and summer       Reviewed and updated as needed this visit by Provider  Med Hx       Review of Systems   Gastrointestinal: Negative for abdominal distention, abdominal pain, nausea and vomiting.            Objective    There were no vitals taken for this visit.  Estimated body mass index is 26.83 kg/m  as calculated from the following:    Height as of 12/30/19: 1.88 m (6' 2\").    Weight as of 12/30/19: 94.8 kg (209 lb).  Physical Exam     \"GENERAL: Healthy, alert and no distress\",\"EYES: Eyes grossly normal to inspection.  No discharge or erythema, or obvious scleral/conjunctival abnormalities.\",\"RESP: No audible wheeze, cough, or visible cyanosis.  No visible retractions or increased work of breathing.  \",\"SKIN: Visible skin clear. No significant rash, abnormal pigmentation or lesions.\",\"NEURO: Cranial nerves grossly intact.  Mentation and speech appropriate for age.\",\"PSYCH: Mentation appears normal, affect normal/bright, judgement and insight intact, normal speech and appearance well-groomed.\"    Assessment & Plan     1. Elevated LFT  Recheck labs, will also add " labs for physical visit.   Patient will schedule physical in July.    Return in about 1 month (around 7/15/2020) for Physical Exam.    John Silverman MD  [facility]      Video-Visit Details    Type of service:  Video Visit    Video End Time:11:38 AM    Originating Location (pt. Location): Home    Distant Location (provider location):  [facility]     Platform used for Video Visit: Doximity    Return in about 1 month (around 7/15/2020) for Physical Exam.       John Silverman MD

## 2020-06-17 DIAGNOSIS — Z13.220 ENCOUNTER FOR LIPID SCREENING FOR CARDIOVASCULAR DISEASE: ICD-10-CM

## 2020-06-17 DIAGNOSIS — Z13.6 ENCOUNTER FOR LIPID SCREENING FOR CARDIOVASCULAR DISEASE: ICD-10-CM

## 2020-06-17 DIAGNOSIS — Z00.00 ENCOUNTER FOR ANNUAL PHYSICAL EXAM: ICD-10-CM

## 2020-06-17 LAB
ALBUMIN SERPL-MCNC: 4 G/DL (ref 3.4–5)
ALBUMIN UR-MCNC: NEGATIVE MG/DL
ALP SERPL-CCNC: 190 U/L (ref 40–150)
ALT SERPL W P-5'-P-CCNC: 83 U/L (ref 0–70)
ANION GAP SERPL CALCULATED.3IONS-SCNC: 2 MMOL/L (ref 3–14)
APPEARANCE UR: CLEAR
AST SERPL W P-5'-P-CCNC: 38 U/L (ref 0–45)
BILIRUB SERPL-MCNC: 0.7 MG/DL (ref 0.2–1.3)
BILIRUB UR QL STRIP: NEGATIVE
BUN SERPL-MCNC: 16 MG/DL (ref 7–30)
CALCIUM SERPL-MCNC: 8.5 MG/DL (ref 8.5–10.1)
CHLORIDE SERPL-SCNC: 103 MMOL/L (ref 94–109)
CHOLEST SERPL-MCNC: 161 MG/DL
CO2 SERPL-SCNC: 32 MMOL/L (ref 20–32)
COLOR UR AUTO: YELLOW
CREAT SERPL-MCNC: 0.94 MG/DL (ref 0.66–1.25)
ERYTHROCYTE [DISTWIDTH] IN BLOOD BY AUTOMATED COUNT: 12.4 % (ref 10–15)
GFR SERPL CREATININE-BSD FRML MDRD: >90 ML/MIN/{1.73_M2}
GLUCOSE SERPL-MCNC: 87 MG/DL (ref 70–99)
GLUCOSE UR STRIP-MCNC: NEGATIVE MG/DL
HCT VFR BLD AUTO: 47.5 % (ref 40–53)
HDLC SERPL-MCNC: 51 MG/DL
HGB BLD-MCNC: 15.7 G/DL (ref 13.3–17.7)
HGB UR QL STRIP: NEGATIVE
KETONES UR STRIP-MCNC: NEGATIVE MG/DL
LDLC SERPL CALC-MCNC: 96 MG/DL
LEUKOCYTE ESTERASE UR QL STRIP: NEGATIVE
MCH RBC QN AUTO: 29.3 PG (ref 26.5–33)
MCHC RBC AUTO-ENTMCNC: 33.1 G/DL (ref 31.5–36.5)
MCV RBC AUTO: 89 FL (ref 78–100)
NITRATE UR QL: NEGATIVE
NONHDLC SERPL-MCNC: 110 MG/DL
PH UR STRIP: 6 PH (ref 5–7)
PLATELET # BLD AUTO: 213 10E9/L (ref 150–450)
POTASSIUM SERPL-SCNC: 4.6 MMOL/L (ref 3.4–5.3)
PROT SERPL-MCNC: 8.3 G/DL (ref 6.8–8.8)
RBC # BLD AUTO: 5.36 10E12/L (ref 4.4–5.9)
SODIUM SERPL-SCNC: 137 MMOL/L (ref 133–144)
SOURCE: NORMAL
SP GR UR STRIP: 1.02 (ref 1–1.03)
TRIGL SERPL-MCNC: 69 MG/DL
TSH SERPL DL<=0.005 MIU/L-ACNC: 3.54 MU/L (ref 0.4–4)
UROBILINOGEN UR STRIP-ACNC: 0.2 EU/DL (ref 0.2–1)
WBC # BLD AUTO: 6.8 10E9/L (ref 4–11)

## 2020-06-17 PROCEDURE — 84443 ASSAY THYROID STIM HORMONE: CPT | Performed by: INTERNAL MEDICINE

## 2020-06-17 PROCEDURE — 85027 COMPLETE CBC AUTOMATED: CPT | Performed by: INTERNAL MEDICINE

## 2020-06-17 PROCEDURE — 36415 COLL VENOUS BLD VENIPUNCTURE: CPT | Performed by: INTERNAL MEDICINE

## 2020-06-17 PROCEDURE — 80053 COMPREHEN METABOLIC PANEL: CPT | Performed by: INTERNAL MEDICINE

## 2020-06-17 PROCEDURE — 80061 LIPID PANEL: CPT | Performed by: INTERNAL MEDICINE

## 2020-06-17 PROCEDURE — 81003 URINALYSIS AUTO W/O SCOPE: CPT | Performed by: INTERNAL MEDICINE

## 2020-10-14 ENCOUNTER — OFFICE VISIT (OUTPATIENT)
Dept: INTERNAL MEDICINE | Facility: CLINIC | Age: 30
End: 2020-10-14
Payer: COMMERCIAL

## 2020-10-14 VITALS
WEIGHT: 198.3 LBS | SYSTOLIC BLOOD PRESSURE: 130 MMHG | HEIGHT: 75 IN | OXYGEN SATURATION: 95 % | BODY MASS INDEX: 24.66 KG/M2 | TEMPERATURE: 98.3 F | HEART RATE: 95 BPM | DIASTOLIC BLOOD PRESSURE: 82 MMHG | RESPIRATION RATE: 19 BRPM

## 2020-10-14 DIAGNOSIS — B07.8 OTHER VIRAL WARTS: ICD-10-CM

## 2020-10-14 DIAGNOSIS — R74.8 ELEVATED LIVER ENZYMES: ICD-10-CM

## 2020-10-14 DIAGNOSIS — K29.50 CHRONIC GASTRITIS WITHOUT BLEEDING, UNSPECIFIED GASTRITIS TYPE: ICD-10-CM

## 2020-10-14 DIAGNOSIS — Z00.00 ROUTINE GENERAL MEDICAL EXAMINATION AT A HEALTH CARE FACILITY: Primary | ICD-10-CM

## 2020-10-14 PROCEDURE — 99395 PREV VISIT EST AGE 18-39: CPT | Mod: 25 | Performed by: INTERNAL MEDICINE

## 2020-10-14 PROCEDURE — 17110 DESTRUCTION B9 LES UP TO 14: CPT | Performed by: INTERNAL MEDICINE

## 2020-10-14 RX ORDER — OMEPRAZOLE 20 MG/1
20 TABLET, DELAYED RELEASE ORAL DAILY
Qty: 90 TABLET | Refills: 1 | Status: SHIPPED | OUTPATIENT
Start: 2020-10-14 | End: 2021-06-07

## 2020-10-14 ASSESSMENT — ENCOUNTER SYMPTOMS
FREQUENCY: 1
WEAKNESS: 0
ARTHRALGIAS: 0
EYE PAIN: 1
HEARTBURN: 0
JOINT SWELLING: 0
FEVER: 0
HEMATOCHEZIA: 0
COUGH: 0
ABDOMINAL PAIN: 0
PALPITATIONS: 0
MYALGIAS: 0
HEMATURIA: 0
DIZZINESS: 0
NERVOUS/ANXIOUS: 0
PARESTHESIAS: 0
CHILLS: 0
HEADACHES: 0
DIARRHEA: 0
NAUSEA: 0
DYSURIA: 0
SHORTNESS OF BREATH: 0
SORE THROAT: 0
CONSTIPATION: 1

## 2020-10-14 ASSESSMENT — MIFFLIN-ST. JEOR: SCORE: 1945.11

## 2020-10-14 NOTE — PROGRESS NOTES
SUBJECTIVE: 30 year old male needs retreatment of wart(s).    OBJECTIVE: 3 wart(s) noted on the left hands. Size range is 0.2cm.    ASSESSMENT: Warts (Verruca Vulgaris)    PLAN: Liquid nitrogen was applied to 3 wart(s);  the patient will   return at 2-4 week intervals for retreatments as needed.    John Silverman MD

## 2020-10-14 NOTE — NURSING NOTE
"/82   Pulse 95   Temp 98.3  F (36.8  C) (Oral)   Resp 19   Ht 1.905 m (6' 3\")   Wt 89.9 kg (198 lb 4.8 oz)   SpO2 95%   BMI 24.79 kg/m    Patient in for Male Px.  Rhonda Giron CMA    "

## 2020-10-14 NOTE — PROGRESS NOTES
SUBJECTIVE:   CC: Raudel Duran is an 30 year old male who presents for preventative health visit.     Patient has been advised of split billing requirements and indicates understanding: Yes     Labs were done as a part of physical in June 27, 2020 and they were within normal limit except mildly elevated liver enzymes.  He has history of gastritis and takes omeprazole 1 a day.  That help with his acid reflux symptoms and abdominal discomfort.    Healthy Habits:     Getting at least 3 servings of Calcium per day:  Yes    Bi-annual eye exam:  NO    Dental care twice a year:  Yes    Sleep apnea or symptoms of sleep apnea:  None    Diet:  Regular (no restrictions)    Frequency of exercise:  4-5 days/week    Duration of exercise:  45-60 minutes    Taking medications regularly:  Yes    Medication side effects:  None    PHQ-2 Total Score: 0    Additional concerns today:  No    Today's PHQ-2 Score:   PHQ-2 ( 1999 Pf[employer]) 10/14/2020   Q1: Little interest or pleasure in doing things 0   Q2: Feeling down, depressed or hopeless 0   PHQ-2 Score 0   Q1: Little interest or pleasure in doing things Not at all   Q2: Feeling down, depressed or hopeless Not at all   PHQ-2 Score 0     Abuse: Current or Past(Physical, Sexual or Emotional)- No  Do you feel safe in your environment? Yes    Social History     Tobacco Use     Smoking status: Never Smoker     Smokeless tobacco: Never Used   Substance Use Topics     Alcohol use: Yes     Comment: rarely       Alcohol Use 10/14/2020   Prescreen: >3 drinks/day or >7 drinks/week? No   Prescreen: >3 drinks/day or >7 drinks/week? -     Last PSA: No results found for: PSA    Reviewed orders with patient. Reviewed health maintenance and updated orders accordingly - Yes    Reviewed and updated as needed this visit by clinical staff  Tobacco  Allergies  Meds   Med Hx  Surg Hx  Fam Hx  Soc Hx      Reviewed and updated as needed this visit by Provider                History reviewed. No pertinent past  "medical history.   Past Surgical History:   Procedure Laterality Date     ESOPHAGOSCOPY, GASTROSCOPY, DUODENOSCOPY (EGD), COMBINED  2017    hx Sen's Esophagus hx ulcers in [place]     ESOPHAGOSCOPY, GASTROSCOPY, DUODENOSCOPY (EGD), COMBINED  10/21/38811    Dr. Granados [facility]     ESOPHAGOSCOPY, GASTROSCOPY, DUODENOSCOPY (EGD), COMBINED N/A 10/21/2019    Procedure: ESOPHAGOGASTRODUODENOSCOPY, WITH BIOPSY with biopsies by cold forceps;  Surgeon: Regina Granados MD;  Location:  GI     LAPAROSCOPIC CHOLECYSTECTOMY N/A 9/21/2019    Procedure: CHOLECYSTECTOMY, LAPAROSCOPIC;  Surgeon: Nicolas Chandra MD;  Location:  OR       Review of Systems   Constitutional: Negative for chills and fever.   HENT: Negative for congestion, ear pain, hearing loss and sore throat.    Eyes: Positive for pain. Negative for visual disturbance.   Respiratory: Negative for cough and shortness of breath.    Cardiovascular: Negative for chest pain, palpitations and peripheral edema.   Gastrointestinal: Positive for constipation. Negative for abdominal pain, diarrhea, heartburn, hematochezia and nausea.   Genitourinary: Positive for frequency. Negative for discharge, dysuria, genital sores, hematuria, impotence and urgency.   Musculoskeletal: Negative for arthralgias, joint swelling and myalgias.   Skin: Negative for rash.   Neurological: Negative for dizziness, weakness, headaches and paresthesias.   Psychiatric/Behavioral: Negative for mood changes. The patient is not nervous/anxious.      OBJECTIVE:   /82   Pulse 95   Temp 98.3  F (36.8  C) (Oral)   Resp 19   Ht 1.905 m (6' 3\")   Wt 89.9 kg (198 lb 4.8 oz)   SpO2 95%   BMI 24.79 kg/m      Physical Exam  GENERAL: healthy, alert and no distress  EYES: Eyes grossly normal to inspection, PERRL and conjunctivae and sclerae normal  HENT: ear canals and TM's normal, nose and mouth without ulcers or lesions  NECK: no adenopathy, no asymmetry, masses, or scars and thyroid normal " "to palpation  RESP: lungs clear to auscultation - no rales, rhonchi or wheezes  CV: regular rate and rhythm, normal S1 S2, no S3 or S4, no murmur, click or rub, no peripheral edema and peripheral pulses strong  ABDOMEN: soft, nontender, no hepatosplenomegaly, no masses and bowel sounds normal  MS: no gross musculoskeletal defects noted, no edema  SKIN: no suspicious lesions or rashes  NEURO: Normal strength and tone, mentation intact and speech normal  PSYCH: mentation appears normal, affect normal/bright    ASSESSMENT/PLAN:   Raudel was seen today for physical.    Diagnoses and all orders for this visit:    Routine general medical examination at a health care facility    Chronic gastritis without bleeding, unspecified gastritis type  -     omeprazole (PRILOSEC OTC) 20 MG EC tablet; Take 1 tablet (20 mg) by mouth daily    Elevated liver enzymes  -     Cancel: Hepatic panel (Albumin, ALT, AST, Bili, Alk Phos, TP)  -     **Hepatic panel FUTURE 2mo; Future    Other viral warts  -     DESTRUCT BENIGN LESION, UP TO 14      Patient has been advised of split billing requirements and indicates understanding: Yes  COUNSELING:   Reviewed preventive health counseling, as reflected in patient instructions       Regular exercise       Healthy diet/nutrition    Estimated body mass index is 24.79 kg/m  as calculated from the following:    Height as of this encounter: 1.905 m (6' 3\").    Weight as of this encounter: 89.9 kg (198 lb 4.8 oz).       He reports that he has never smoked. He has never used smokeless tobacco.      Counseling Resources:  ATP IV Guidelines  Pooled Cohorts Equation Calculator  FRAX Risk Assessment  ICSI Preventive Guidelines  Dietary Guidelines for Americans, 2010  USDA's MyPlate  ASA Prophylaxis  Lung CA Screening    John Silverman MD  [facility]  "

## 2021-01-03 ENCOUNTER — HEALTH MAINTENANCE LETTER (OUTPATIENT)
Age: 31
End: 2021-01-03

## 2021-03-20 ENCOUNTER — MYC MEDICAL ADVICE (OUTPATIENT)
Dept: INTERNAL MEDICINE | Facility: CLINIC | Age: 31
End: 2021-03-20

## 2021-03-20 DIAGNOSIS — K29.50 CHRONIC GASTRITIS WITHOUT BLEEDING, UNSPECIFIED GASTRITIS TYPE: Primary | ICD-10-CM

## 2021-03-22 ENCOUNTER — MYC MEDICAL ADVICE (OUTPATIENT)
Dept: INTERNAL MEDICINE | Facility: CLINIC | Age: 31
End: 2021-03-22

## 2021-03-22 DIAGNOSIS — R74.8 ELEVATED LIVER ENZYMES: ICD-10-CM

## 2021-03-22 DIAGNOSIS — K21.00 GASTROESOPHAGEAL REFLUX DISEASE WITH ESOPHAGITIS, UNSPECIFIED WHETHER HEMORRHAGE: ICD-10-CM

## 2021-03-22 DIAGNOSIS — Z11.59 ENCOUNTER FOR SCREENING FOR OTHER VIRAL DISEASES: ICD-10-CM

## 2021-03-22 DIAGNOSIS — Z00.00 ENCOUNTER FOR PREVENTIVE HEALTH EXAMINATION: Primary | ICD-10-CM

## 2021-03-22 NOTE — TELEPHONE ENCOUNTER
See mychart message.   Last OV on 10/14/20.     Chronic gastritis was discussed at OV.     Pt takes Omeprazole 20 mg daily.     Last upper GI was on 10/21/19.   No evidence of Sen's esophagus. Dysphagia  resolved on prilosec 20 mg once daily, continue   with this. Dr SANIA Granados.

## 2021-03-22 NOTE — TELEPHONE ENCOUNTER
Please let them know that I am making a decision to do endoscopy based on his symptoms and I did not personally see him to evaluate prior to ordering EGD.  Also his liver enzymes were high when he had visit last year and advise repeat labs to check the liver enzymes which was never done.  Please advise him to schedule lab to do that.    John Silverman MD

## 2021-03-23 DIAGNOSIS — Z11.59 ENCOUNTER FOR SCREENING FOR OTHER VIRAL DISEASES: ICD-10-CM

## 2021-03-23 DIAGNOSIS — Z00.00 ENCOUNTER FOR PREVENTIVE HEALTH EXAMINATION: ICD-10-CM

## 2021-03-23 LAB
BASOPHILS # BLD AUTO: 0 10E9/L (ref 0–0.2)
BASOPHILS NFR BLD AUTO: 0.6 %
DIFFERENTIAL METHOD BLD: NORMAL
EOSINOPHIL # BLD AUTO: 0.2 10E9/L (ref 0–0.7)
EOSINOPHIL NFR BLD AUTO: 3.2 %
ERYTHROCYTE [DISTWIDTH] IN BLOOD BY AUTOMATED COUNT: 12.7 % (ref 10–15)
HCT VFR BLD AUTO: 46 % (ref 40–53)
HGB BLD-MCNC: 15.2 G/DL (ref 13.3–17.7)
LABORATORY COMMENT REPORT: NORMAL
LYMPHOCYTES # BLD AUTO: 2.2 10E9/L (ref 0.8–5.3)
LYMPHOCYTES NFR BLD AUTO: 33.1 %
MCH RBC QN AUTO: 29.3 PG (ref 26.5–33)
MCHC RBC AUTO-ENTMCNC: 33 G/DL (ref 31.5–36.5)
MCV RBC AUTO: 89 FL (ref 78–100)
MONOCYTES # BLD AUTO: 0.6 10E9/L (ref 0–1.3)
MONOCYTES NFR BLD AUTO: 8.9 %
NEUTROPHILS # BLD AUTO: 3.6 10E9/L (ref 1.6–8.3)
NEUTROPHILS NFR BLD AUTO: 54.2 %
PLATELET # BLD AUTO: 217 10E9/L (ref 150–450)
RBC # BLD AUTO: 5.18 10E12/L (ref 4.4–5.9)
SARS-COV-2 RNA RESP QL NAA+PROBE: NEGATIVE
SARS-COV-2 RNA RESP QL NAA+PROBE: NORMAL
SPECIMEN SOURCE: NORMAL
SPECIMEN SOURCE: NORMAL
WBC # BLD AUTO: 6.7 10E9/L (ref 4–11)

## 2021-03-23 PROCEDURE — 36415 COLL VENOUS BLD VENIPUNCTURE: CPT | Performed by: INTERNAL MEDICINE

## 2021-03-23 PROCEDURE — U0003 INFECTIOUS AGENT DETECTION BY NUCLEIC ACID (DNA OR RNA); SEVERE ACUTE RESPIRATORY SYNDROME CORONAVIRUS 2 (SARS-COV-2) (CORONAVIRUS DISEASE [COVID-19]), AMPLIFIED PROBE TECHNIQUE, MAKING USE OF HIGH THROUGHPUT TECHNOLOGIES AS DESCRIBED BY CMS-2020-01-R: HCPCS | Performed by: INTERNAL MEDICINE

## 2021-03-23 PROCEDURE — 80061 LIPID PANEL: CPT | Performed by: INTERNAL MEDICINE

## 2021-03-23 PROCEDURE — U0005 INFEC AGEN DETEC AMPLI PROBE: HCPCS | Performed by: INTERNAL MEDICINE

## 2021-03-23 PROCEDURE — 80050 GENERAL HEALTH PANEL: CPT | Performed by: INTERNAL MEDICINE

## 2021-03-23 NOTE — TELEPHONE ENCOUNTER
Lab orders placed for physical.  Please inform him that he needs a physical for these labs to be covered by insurance and if he does not do physical he may have to pay out-of-pocket.    John Silverman MD

## 2021-03-23 NOTE — LETTER
March 24, 2021      Raudel PAULO KATHY Duran  [address] [place] [id number]              Dear Raudel,    Labs are within acceptable limits except persistently elevated liver enzymes.  He already had gallbladder removed.     Make sure you do not drink alcohol regularly or take any over-the-counter anti-inflammatories like Aleve Motrin, Naprosyn regularly.     Would recommend seeing gastroenterologist for elevated liver enzymes.   Also please schedule appointment to do physical so the labs will be covered.     Call for GI appointment.   [facility]   [address] [address]   Phone: [phone]       Sincerely,      John Silverman MD        Results for orders placed or performed in visit on 03/23/21   Asymptomatic COVID-19 Virus (Coronavirus) by PCR     Status: None    Specimen: Nasopharyngeal   Result Value Ref Range    COVID-19 Virus PCR to [facility] - Source Nasopharyngeal     COVID-19 Virus PCR to [facility] - Result       Test received-See reflex to IDDL test SARS CoV2 (COVID-19) Virus RT-PCR   CBC with platelets and differential     Status: None   Result Value Ref Range    WBC 6.7 4.0 - 11.0 10e9/L    RBC Count 5.18 4.4 - 5.9 10e12/L    Hemoglobin 15.2 13.3 - 17.7 g/dL    Hematocrit 46.0 40.0 - 53.0 %    MCV 89 78 - 100 fl    MCH 29.3 26.5 - 33.0 pg    MCHC 33.0 31.5 - 36.5 g/dL    RDW 12.7 10.0 - 15.0 %    Platelet Count 217 150 - 450 10e9/L    % Neutrophils 54.2 %    % Lymphocytes 33.1 %    % Monocytes 8.9 %    % Eosinophils 3.2 %    % Basophils 0.6 %    Absolute Neutrophil 3.6 1.6 - 8.3 10e9/L    Absolute Lymphocytes 2.2 0.8 - 5.3 10e9/L    Absolute Monocytes 0.6 0.0 - 1.3 10e9/L    Absolute Eosinophils 0.2 0.0 - 0.7 10e9/L    Absolute Basophils 0.0 0.0 - 0.2 10e9/L    Diff Method Automated Method    Comprehensive metabolic panel (BMP + Alb, Alk Phos, ALT, AST, Total. Bili, TP)     Status: Abnormal   Result Value Ref Range    Sodium 139 133 - 144 mmol/L     Potassium 3.9 3.4 - 5.3 mmol/L    Chloride 105 94 - 109 mmol/L    Carbon Dioxide 28 20 - 32 mmol/L    Anion Gap 6 3 - 14 mmol/L    Glucose 91 70 - 99 mg/dL    Urea Nitrogen 13 7 - 30 mg/dL    Creatinine 0.91 0.66 - 1.25 mg/dL    GFR Estimate >90 >60 mL/min/[1.73_m2]    GFR Estimate If Black >90 >60 mL/min/[1.73_m2]    Calcium 9.0 8.5 - 10.1 mg/dL    Bilirubin Total 0.5 0.2 - 1.3 mg/dL    Albumin 4.0 3.4 - 5.0 g/dL    Protein Total 7.6 6.8 - 8.8 g/dL    Alkaline Phosphatase 180 (H) 40 - 150 U/L     (H) 0 - 70 U/L    AST 38 0 - 45 U/L   Lipid panel reflex to direct LDL Fasting     Status: None   Result Value Ref Range    Cholesterol 167 <200 mg/dL    Triglycerides 93 <150 mg/dL    HDL Cholesterol 55 >39 mg/dL    LDL Cholesterol Calculated 93 <100 mg/dL    Non HDL Cholesterol 112 <130 mg/dL   TSH with free T4 reflex     Status: None   Result Value Ref Range    TSH 3.80 0.40 - 4.00 mU/L   SARS-CoV-2 COVID-19 Virus (Coronavirus) by PCR     Status: None    Specimen: Nasopharyngeal   Result Value Ref Range    SARS-CoV-2 Virus Specimen Source Nasopharyngeal     SARS-CoV-2 PCR Result NEGATIVE     SARS-CoV-2 PCR Comment       Testing was performed using the Xpert Xpress SARS-CoV-2 Assay on the Cepheid Gene-Xpert   Instrument Systems. Additional information about this Emergency Use Authorization (EUA)   assay can be found via the Lab Guide.

## 2021-03-24 LAB
ALBUMIN SERPL-MCNC: 4 G/DL (ref 3.4–5)
ALP SERPL-CCNC: 180 U/L (ref 40–150)
ALT SERPL W P-5'-P-CCNC: 101 U/L (ref 0–70)
ANION GAP SERPL CALCULATED.3IONS-SCNC: 6 MMOL/L (ref 3–14)
AST SERPL W P-5'-P-CCNC: 38 U/L (ref 0–45)
BILIRUB SERPL-MCNC: 0.5 MG/DL (ref 0.2–1.3)
BUN SERPL-MCNC: 13 MG/DL (ref 7–30)
CALCIUM SERPL-MCNC: 9 MG/DL (ref 8.5–10.1)
CHLORIDE SERPL-SCNC: 105 MMOL/L (ref 94–109)
CHOLEST SERPL-MCNC: 167 MG/DL
CO2 SERPL-SCNC: 28 MMOL/L (ref 20–32)
CREAT SERPL-MCNC: 0.91 MG/DL (ref 0.66–1.25)
GFR SERPL CREATININE-BSD FRML MDRD: >90 ML/MIN/{1.73_M2}
GLUCOSE SERPL-MCNC: 91 MG/DL (ref 70–99)
HDLC SERPL-MCNC: 55 MG/DL
LDLC SERPL CALC-MCNC: 93 MG/DL
NONHDLC SERPL-MCNC: 112 MG/DL
POTASSIUM SERPL-SCNC: 3.9 MMOL/L (ref 3.4–5.3)
PROT SERPL-MCNC: 7.6 G/DL (ref 6.8–8.8)
SODIUM SERPL-SCNC: 139 MMOL/L (ref 133–144)
TRIGL SERPL-MCNC: 93 MG/DL
TSH SERPL DL<=0.005 MIU/L-ACNC: 3.8 MU/L (ref 0.4–4)

## 2021-03-26 ENCOUNTER — HOSPITAL ENCOUNTER (OUTPATIENT)
Facility: CLINIC | Age: 31
Discharge: HOME | End: 2021-03-26
Attending: INTERNAL MEDICINE | Admitting: INTERNAL MEDICINE
Payer: COMMERCIAL

## 2021-03-26 VITALS
TEMPERATURE: 97.9 F | DIASTOLIC BLOOD PRESSURE: 80 MMHG | HEART RATE: 63 BPM | OXYGEN SATURATION: 96 % | SYSTOLIC BLOOD PRESSURE: 132 MMHG | RESPIRATION RATE: 16 BRPM | WEIGHT: 204 LBS | HEIGHT: 75 IN | BODY MASS INDEX: 25.36 KG/M2

## 2021-03-26 LAB — UPPER GI ENDOSCOPY: NORMAL

## 2021-03-26 PROCEDURE — 250N000009 HC RX 250: Performed by: INTERNAL MEDICINE

## 2021-03-26 PROCEDURE — 43239 EGD BIOPSY SINGLE/MULTIPLE: CPT | Performed by: INTERNAL MEDICINE

## 2021-03-26 PROCEDURE — 88305 TISSUE EXAM BY PATHOLOGIST: CPT | Mod: 26 | Performed by: PATHOLOGY

## 2021-03-26 PROCEDURE — 250N000011 HC RX IP 250 OP 636: Performed by: INTERNAL MEDICINE

## 2021-03-26 PROCEDURE — 999N000099 HC STATISTIC MODERATE SEDATION < 10 MIN: Performed by: INTERNAL MEDICINE

## 2021-03-26 PROCEDURE — 88305 TISSUE EXAM BY PATHOLOGIST: CPT | Mod: TC | Performed by: INTERNAL MEDICINE

## 2021-03-26 RX ORDER — ONDANSETRON 2 MG/ML
4 INJECTION INTRAMUSCULAR; INTRAVENOUS
Status: DISCONTINUED | OUTPATIENT
Start: 2021-03-26 | End: 2021-03-26 | Stop reason: HOSPADM

## 2021-03-26 RX ORDER — ONDANSETRON 4 MG/1
4 TABLET, ORALLY DISINTEGRATING ORAL EVERY 6 HOURS PRN
Status: DISCONTINUED | OUTPATIENT
Start: 2021-03-26 | End: 2021-03-26 | Stop reason: HOSPADM

## 2021-03-26 RX ORDER — LIDOCAINE 40 MG/G
CREAM TOPICAL
Status: DISCONTINUED | OUTPATIENT
Start: 2021-03-26 | End: 2021-03-26 | Stop reason: HOSPADM

## 2021-03-26 RX ORDER — NALOXONE HYDROCHLORIDE 0.4 MG/ML
0.2 INJECTION, SOLUTION INTRAMUSCULAR; INTRAVENOUS; SUBCUTANEOUS
Status: DISCONTINUED | OUTPATIENT
Start: 2021-03-26 | End: 2021-03-26 | Stop reason: HOSPADM

## 2021-03-26 RX ORDER — PROCHLORPERAZINE MALEATE 10 MG
10 TABLET ORAL EVERY 6 HOURS PRN
Status: DISCONTINUED | OUTPATIENT
Start: 2021-03-26 | End: 2021-03-26 | Stop reason: HOSPADM

## 2021-03-26 RX ORDER — NALOXONE HYDROCHLORIDE 0.4 MG/ML
0.4 INJECTION, SOLUTION INTRAMUSCULAR; INTRAVENOUS; SUBCUTANEOUS
Status: DISCONTINUED | OUTPATIENT
Start: 2021-03-26 | End: 2021-03-26 | Stop reason: HOSPADM

## 2021-03-26 RX ORDER — FENTANYL CITRATE 50 UG/ML
INJECTION, SOLUTION INTRAMUSCULAR; INTRAVENOUS PRN
Status: COMPLETED | OUTPATIENT
Start: 2021-03-26 | End: 2021-03-26

## 2021-03-26 RX ORDER — ONDANSETRON 2 MG/ML
4 INJECTION INTRAMUSCULAR; INTRAVENOUS EVERY 6 HOURS PRN
Status: DISCONTINUED | OUTPATIENT
Start: 2021-03-26 | End: 2021-03-26 | Stop reason: HOSPADM

## 2021-03-26 RX ORDER — FLUMAZENIL 0.1 MG/ML
0.2 INJECTION, SOLUTION INTRAVENOUS
Status: DISCONTINUED | OUTPATIENT
Start: 2021-03-26 | End: 2021-03-26 | Stop reason: HOSPADM

## 2021-03-26 RX ORDER — ONDANSETRON 2 MG/ML
INJECTION INTRAMUSCULAR; INTRAVENOUS PRN
Status: COMPLETED | OUTPATIENT
Start: 2021-03-26 | End: 2021-03-26

## 2021-03-26 RX ADMIN — ONDANSETRON 4 MG: 2 INJECTION INTRAMUSCULAR; INTRAVENOUS at 07:34

## 2021-03-26 RX ADMIN — TOPICAL ANESTHETIC 1 SPRAY: 200 SPRAY DENTAL; PERIODONTAL at 07:34

## 2021-03-26 RX ADMIN — FENTANYL CITRATE 100 MCG: 50 INJECTION, SOLUTION INTRAMUSCULAR; INTRAVENOUS at 07:34

## 2021-03-26 RX ADMIN — MIDAZOLAM 2 MG: 1 INJECTION INTRAMUSCULAR; INTRAVENOUS at 07:34

## 2021-03-26 ASSESSMENT — MIFFLIN-ST. JEOR: SCORE: 1970.97

## 2021-03-26 NOTE — LETTER
March 23, 2021      Raudel Duran  [address] [place] [address]        Dear Raudel,     Thank you for choosing [facility]. You are scheduled for the following service(s).   Please be aware that coverage of these services is subject to the terms and limitations of your health insurance plan.  Call member services at your health plan with any benefit or coverage questions.    Date:   3/26/21      Procedure: UPPER ENDOSCOPY-EGD  Doctor: Dr. Irby           Arrival Time:  7:00    *Enter and check in at the Main Hospital Entrance  Procedure Time: 7:30       Location:   [facility]        Endoscopy Department, First Floor *         [address] [place], [place] [address]      [phone] or [phone] () to reschedule          PRE-PROCEDURE CHECKLIST    If you have diabetes, ask your regular doctor for diet and medication restrictions.  If you take any antiplatelet or anticoagulant medications (such as Coumadin, Lovenox, Plavix, etc.) and have not already discussed this, please call your primary physician for advice on holding this medication.  If you take Aspirin, you may continue to do so.  If you are or may be pregnant, please discuss the risks and benefits of this procedure with your doctor.  You must arrange for a ride for the day of your exam. If you fail to arrange transportation with a responsible adult, your procedure will need to be cancelled and rescheduled. Taxi, bus and medical transport are not acceptable unless you have a responsible adult that you know & trust with you. Please arrange for this  to be able to pick you up in our department, approximately one hour after your scheduled procedure, if they are not able to stay with you.      Canceling or rescheduling   If you must cancel or reschedule your appointment, please call [phone] as soon as possible.      Upper Endoscopy or Esophagogastroduodenoscopy (EGD) is a  test performed to evaluate symptoms of persistent abdominal pain, nausea, vomiting and difficulty swallowing. It may also be used to treat various conditions of the upper gastrointestinal tract, such as bleeding, narrowing or abnormal growths.     What happens during an upper endoscopy?  On the day of your procedure, plan to spend up to one and a half hour after your arrival at the endoscopy center. The exam itself takes about 5 to 10 minutes.    Before the exam:  - You will change into a gown.   - Your medical history and medication list will be reviewed with you, unless it has already been done over the phone.   - A nurse will insert an intravenous (IV) line into your hand or arm.  - The doctor will talk to you and give you a consent form to sign.    During the exam:  - Medicine will be given through the IV line to help you relax and feel comfortable.   - Your heart rate and oxygen levels will be monitored. If your blood pressure is low, you may be given fluids through the IV line.   - The doctor will insert a flexible, hollow tube, called an endoscope, into your mouth and will advance it slowly through the esophagus, stomach and duodenum (the first part of your small intestine).   - You may have a feeling of pressure or fullness.   - If you have difficulty swallowing, and the doctor finds a narrowing in your esophagus, it may be possible for the area to be expanded-dilated during the exam.   - If abnormal tissue is found, the doctor may remove it through the endoscope (biopsy it) for closer examination. The tissue removal is painless.    After the exam:  - Any tissue samples removed during the exam will be sent to a lab for evaluation. It may take 5 to 7 working days for you to be notified of the results  - The doctor will prepare a full report for the physician who referred you for the upper endoscopy.   - The doctor will talk with you about the initial results of your exam.   - You may feel bloated after the  procedure. That is normal and should not last long.   - Your throat may feel sore for a short time.   - Following the exam, you may resume your normal diet. Avoid alcohol until the next day.   - You may resume your regular activities the day after the procedure.   - Medication given during the exam will prohibit you from driving for the rest of the day.  - A nurse will provide you with complete discharge instructions before you leave the endoscopy center. Be sure to ask the nurse for specific instructions if you take blood thinners such as Aspirin , Coumadin , Lovenox , Plavix , etc.       PREPARATION    To ensure a successful exam, please follow all instructions carefully.      The night before your exam:    STOP eating solid foods at 11:45 pm.     Clear liquids are okay to drink (examples: Gatorade , apple juice, clear broth,coffee or tea without milk or cream, etc.).     DO NOT drink red liquids or alcoholic beverages.    The day of your exam:    STOP drinking clear liquids 4 hours before your exam.     You may take your usual medications with 4 oz. of water, but it needs to be at least 4 hours prior to your procedure.    When you leave for the procedure:    Bring a list of all of your current medications, including any allergy or over-the-counter medications, unless you have already reviewed that with an Endoscopy RN over the phone.     Bring a photo ID as well as up-to-date insurance information, such as your insurance card and any referral forms that might be required by your payer.       DIRECTIONS TO THE ENDOSCOPY DEPARTMENT    From the north ([facility])  Take [address] South, exit on [address] . Get into the left hand jodie, turn left (east), go one-half mile to [address] and turn left. Go north to the second stoplight, take a right on [address] and follow it to the Main Hospital entrance.  From the south ([facility])  Take [address]N to the [address] split and exit on  [address] . On [address] , turn left (west) to [address]. Turn right (north) on [address]. Go north to the second stoplight, take a right on [address] and follow it to the Main Hospital entrance.  From the east via 35E ([facility])  Take [address]E south to [place] [address] exit. Turn right on [address] . Go west to [address]. Turn right (north) on [address]. Go to the second stoplight, take a right on [address] to the Main Hospital entrance.  From the east via [address] ([facility])  Take [address] to [address]. Turn left (south) on [address] to [address], turn left (east) on [address] and follow it to the Main Hospital entrance.    From the west via [address] ([place], [place])  Take [address] east to [address]. Turn right (south) on [address] to [address], turn left (east) on [address] and follow it to the Main Hospital entrance.

## 2021-03-26 NOTE — H&P
Pre-Endoscopy History and Physical     Raudel Duran MRN# [id number]   YOB: 1990 Age: 30 year old     Date of Procedure: 3/26/2021  Primary care provider: John Silverman  Type of Endoscopy: Gastroscopy with possible biopsy, possible dilation  Reason for Procedure: reflux  Type of Anesthesia Anticipated: Conscious Sedation    HPI:    Raudel is a 30 year old male who will be undergoing the above procedure.      A history and physical has been performed. The patient's medications and allergies have been reviewed. The risks and benefits of the procedure and the sedation options and risks were discussed with the patient.  All questions were answered and informed consent was obtained.      He denies a personal or family history of anesthesia complications or bleeding disorders.     Patient Active Problem List   Diagnosis   (none) - all problems resolved or deleted        No past medical history on file.     Past Surgical History:   Procedure Laterality Date     ESOPHAGOSCOPY, GASTROSCOPY, DUODENOSCOPY (EGD), COMBINED  2017    hx Sen's Esophagus hx ulcers in [place]     ESOPHAGOSCOPY, GASTROSCOPY, DUODENOSCOPY (EGD), COMBINED  10/21/35923    Dr. Granados [facility]     ESOPHAGOSCOPY, GASTROSCOPY, DUODENOSCOPY (EGD), COMBINED N/A 10/21/2019    Procedure: ESOPHAGOGASTRODUODENOSCOPY, WITH BIOPSY with biopsies by cold forceps;  Surgeon: Regina Granados MD;  Location:  GI     LAPAROSCOPIC CHOLECYSTECTOMY N/A 9/21/2019    Procedure: CHOLECYSTECTOMY, LAPAROSCOPIC;  Surgeon: Nicolas Chandra MD;  Location:  OR       Social History     Tobacco Use     Smoking status: Never Smoker     Smokeless tobacco: Never Used   Substance Use Topics     Alcohol use: Yes     Comment: rarely       Family History   Problem Relation Age of Onset     Hypertension Father      Cerebrovascular Disease Maternal Grandmother      No Known Problems Mother      Colon Cancer No family hx of        Prior to Admission medications   "  Medication Sig Start Date End Date Taking? Authorizing Provider   omeprazole (PRILOSEC OTC) 20 MG EC tablet Take 1 tablet (20 mg) by mouth daily 10/14/20   John Silverman MD       Allergies   Allergen Reactions     Food Itching     History of bananas, avocados     Seasonal Allergies      Spring and summer        REVIEW OF SYSTEMS:   5 point ROS negative except as noted above in HPI, including Gen., Resp., CV, GI &  system review.    PHYSICAL EXAM:   There were no vitals taken for this visit. Estimated body mass index is 24.79 kg/m  as calculated from the following:    Height as of 10/14/20: 1.905 m (6' 3\").    Weight as of 10/14/20: 89.9 kg (198 lb 4.8 oz).   GENERAL APPEARANCE: alert, and oriented  MENTAL STATUS: alert  AIRWAY EXAM: Mallampatti Class I (visualization of the soft palate, fauces, uvula, anterior and posterior pillars)  RESP: lungs clear to auscultation - no rales, rhonchi or wheezes  CV: regular rates and rhythm  DIAGNOSTICS:    Not indicated    IMPRESSION   ASA Class 2 - Mild systemic disease    PLAN:   Plan for Gastroscopy with possible biopsy, possible dilation. We discussed the risks, benefits and alternatives and the patient wished to proceed.    The above has been forwarded to the consulting provider.      Signed Electronically by: Zen Irby MD  March 26, 2021          "

## 2021-03-29 LAB — COPATH REPORT: NORMAL

## 2021-04-29 ENCOUNTER — IMMUNIZATION (OUTPATIENT)
Dept: NURSING | Facility: CLINIC | Age: 31
End: 2021-04-29
Payer: COMMERCIAL

## 2021-04-29 PROCEDURE — 0001A PR COVID VAC PFIZER DIL RECON 30 MCG/0.3 ML IM: CPT

## 2021-04-29 PROCEDURE — 91300 PR COVID VAC PFIZER DIL RECON 30 MCG/0.3 ML IM: CPT

## 2021-05-24 ENCOUNTER — IMMUNIZATION (OUTPATIENT)
Dept: NURSING | Facility: CLINIC | Age: 31
End: 2021-05-24
Attending: INTERNAL MEDICINE
Payer: COMMERCIAL

## 2021-05-24 PROCEDURE — 0002A PR COVID VAC PFIZER DIL RECON 30 MCG/0.3 ML IM: CPT

## 2021-05-24 PROCEDURE — 91300 PR COVID VAC PFIZER DIL RECON 30 MCG/0.3 ML IM: CPT

## 2021-06-04 ENCOUNTER — MYC MEDICAL ADVICE (OUTPATIENT)
Dept: INTERNAL MEDICINE | Facility: CLINIC | Age: 31
End: 2021-06-04

## 2021-10-10 ENCOUNTER — HEALTH MAINTENANCE LETTER (OUTPATIENT)
Age: 31
End: 2021-10-10

## 2021-10-29 ENCOUNTER — TELEPHONE (OUTPATIENT)
Dept: INTERNAL MEDICINE | Facility: CLINIC | Age: 31
End: 2021-10-29

## 2021-10-29 DIAGNOSIS — Z00.00 ROUTINE HISTORY AND PHYSICAL EXAMINATION OF ADULT: Primary | ICD-10-CM

## 2021-10-29 NOTE — TELEPHONE ENCOUNTER
Reason for Call:  Other Lab orders    Detailed comments: Pt has a PX appt on 11/18 and would like to requests a full panel of labs done; including A1c, lipids, and liver enzymes. Please call when orders are placed.    Phone Number Patient can be reached at: Cell number on file:    Telephone Information:   Mobile [phone]       Best Time: any    Can we leave a detailed message on this number? YES    Call taken on 10/29/2021 at 8:13 AM by Chelsea Turner

## 2021-10-29 NOTE — TELEPHONE ENCOUNTER
Left voice message for patient to clarify if he is requesting to have labs drawn prior to appointment with Dr. Sweeney.     Rhonda Vallecillo RN  [facility]

## 2021-11-03 NOTE — TELEPHONE ENCOUNTER
Contacted patient, he would like to have labs drawn prior to physical with Dr. Sweeney so they can review results.     Routed to provider to review if labs can be drawn prior to appointment or if patient needs to see provider first.    Rhonda Vallecillo RN  [facility]

## 2021-11-18 ENCOUNTER — OFFICE VISIT (OUTPATIENT)
Dept: INTERNAL MEDICINE | Facility: CLINIC | Age: 31
End: 2021-11-18
Payer: COMMERCIAL

## 2021-11-18 VITALS
RESPIRATION RATE: 20 BRPM | HEART RATE: 78 BPM | HEIGHT: 75 IN | OXYGEN SATURATION: 99 % | WEIGHT: 208 LBS | BODY MASS INDEX: 25.86 KG/M2 | SYSTOLIC BLOOD PRESSURE: 134 MMHG | DIASTOLIC BLOOD PRESSURE: 82 MMHG

## 2021-11-18 DIAGNOSIS — Z00.00 ROUTINE HISTORY AND PHYSICAL EXAMINATION OF ADULT: Primary | ICD-10-CM

## 2021-11-18 DIAGNOSIS — B07.9 VIRAL WARTS, UNSPECIFIED TYPE: ICD-10-CM

## 2021-11-18 LAB
ALBUMIN UR-MCNC: NEGATIVE MG/DL
APPEARANCE UR: CLEAR
BASOPHILS # BLD AUTO: 0 10E3/UL (ref 0–0.2)
BASOPHILS NFR BLD AUTO: 0 %
BILIRUB UR QL STRIP: NEGATIVE
COLOR UR AUTO: YELLOW
EOSINOPHIL # BLD AUTO: 0.3 10E3/UL (ref 0–0.7)
EOSINOPHIL NFR BLD AUTO: 3 %
ERYTHROCYTE [DISTWIDTH] IN BLOOD BY AUTOMATED COUNT: 12.8 % (ref 10–15)
GLUCOSE UR STRIP-MCNC: NEGATIVE MG/DL
HCT VFR BLD AUTO: 47.5 % (ref 40–53)
HGB BLD-MCNC: 15.7 G/DL (ref 13.3–17.7)
HGB UR QL STRIP: NEGATIVE
KETONES UR STRIP-MCNC: NEGATIVE MG/DL
LEUKOCYTE ESTERASE UR QL STRIP: NEGATIVE
LYMPHOCYTES # BLD AUTO: 2.4 10E3/UL (ref 0.8–5.3)
LYMPHOCYTES NFR BLD AUTO: 31 %
MCH RBC QN AUTO: 29.6 PG (ref 26.5–33)
MCHC RBC AUTO-ENTMCNC: 33.1 G/DL (ref 31.5–36.5)
MCV RBC AUTO: 90 FL (ref 78–100)
MONOCYTES # BLD AUTO: 0.7 10E3/UL (ref 0–1.3)
MONOCYTES NFR BLD AUTO: 9 %
NEUTROPHILS # BLD AUTO: 4.2 10E3/UL (ref 1.6–8.3)
NEUTROPHILS NFR BLD AUTO: 56 %
NITRATE UR QL: NEGATIVE
PH UR STRIP: 7 [PH] (ref 5–7)
PLATELET # BLD AUTO: 222 10E3/UL (ref 150–450)
RBC # BLD AUTO: 5.31 10E6/UL (ref 4.4–5.9)
RBC #/AREA URNS AUTO: NORMAL /HPF
SP GR UR STRIP: 1.01 (ref 1–1.03)
UROBILINOGEN UR STRIP-ACNC: 0.2 E.U./DL
WBC # BLD AUTO: 7.6 10E3/UL (ref 4–11)
WBC #/AREA URNS AUTO: NORMAL /HPF

## 2021-11-18 PROCEDURE — 36415 COLL VENOUS BLD VENIPUNCTURE: CPT | Performed by: INTERNAL MEDICINE

## 2021-11-18 PROCEDURE — 17110 DESTRUCTION B9 LES UP TO 14: CPT | Performed by: INTERNAL MEDICINE

## 2021-11-18 PROCEDURE — 80053 COMPREHEN METABOLIC PANEL: CPT | Performed by: INTERNAL MEDICINE

## 2021-11-18 PROCEDURE — 80061 LIPID PANEL: CPT | Performed by: INTERNAL MEDICINE

## 2021-11-18 PROCEDURE — 99395 PREV VISIT EST AGE 18-39: CPT | Mod: 25 | Performed by: INTERNAL MEDICINE

## 2021-11-18 PROCEDURE — 81001 URINALYSIS AUTO W/SCOPE: CPT | Performed by: INTERNAL MEDICINE

## 2021-11-18 PROCEDURE — 85025 COMPLETE CBC W/AUTO DIFF WBC: CPT | Performed by: INTERNAL MEDICINE

## 2021-11-18 RX ORDER — DIPHENHYDRAMINE HCL 25 MG
25 TABLET ORAL EVERY 6 HOURS PRN
COMMUNITY
End: 2022-08-29

## 2021-11-18 ASSESSMENT — ENCOUNTER SYMPTOMS
ARTHRALGIAS: 0
JOINT SWELLING: 0
COUGH: 0
EYE PAIN: 0
HEARTBURN: 0
NERVOUS/ANXIOUS: 0
HEMATURIA: 0
HEMATOCHEZIA: 0
FREQUENCY: 0
PALPITATIONS: 0
DIARRHEA: 0
SORE THROAT: 0
WEAKNESS: 0
ABDOMINAL PAIN: 0
FEVER: 0
SHORTNESS OF BREATH: 0
DYSURIA: 0
PARESTHESIAS: 0
FREQUENCY: 1
CHILLS: 0
DIZZINESS: 0
MYALGIAS: 0
CONSTIPATION: 0
NAUSEA: 0
HEADACHES: 0

## 2021-11-18 ASSESSMENT — MIFFLIN-ST. JEOR: SCORE: 1984.11

## 2021-11-18 NOTE — PROGRESS NOTES
SUBJECTIVE:   CC: Raudel Duran is an 31 year old male who presents for preventative health visit.       Patient has been advised of split billing requirements and indicates understanding: Yes  Patient is a 31-year-old  male who presents to the clinic for his annual physical.  He has no major concerns or issues today.  Patient reports a stable appetite.  He had previously had some issues with elevated liver enzymes secondary to chronic gastritis.  Patient had previously been taking omeprazole, but he has since discontinued this medication.  He is not reporting any gastrointestinal symptoms.  He is stooling and voiding without issue.  Patient does report occasional episodes of increased frequency of urination.  He reports that he will typically get urinalysis checked every so often to ensure that there are no underlying issues patient does exercise 4-5 times per week.  He does a combination of cardiovascular work and strength training.  He has had his influenza vaccination.  Patient is not yet due for his Covid booster.  Patient is fasting for labs today.  He does have 3 warts located on his left hand that he would like to have frozen at this time.  Patient states that he has had these lesions frozen on several occasions over the past several years, they have never fully resolved.  Of note, he and his wife are expecting their first child in April 2022.    Healthy Habits:    Getting at least 3 servings of Calcium per day:  Yes    Bi-annual eye exam:  NO    Dental care twice a year:  Yes    Sleep apnea or symptoms of sleep apnea:  None    Diet:  Regular (no restrictions)    Frequency of exercise:  4-5 days/week    Duration of exercise:  45-60 minutes    Taking medications regularly:  Yes    Medication side effects:  Not applicable and None    PHQ-2 Total Score:    Additional concerns today:  No    Ability to successfully perform activities of daily living: Yes, no assistance needed  Home safety:  none  identified   Hearing impairment: Yes,             Today's PHQ-2 Score:   PHQ-2 ( 1999 Pfizer) 11/18/2021   Q1: Little interest or pleasure in doing things 0   Q2: Feeling down, depressed or hopeless 0   PHQ-2 Score 0   Q1: Little interest or pleasure in doing things Not at all   Q2: Feeling down, depressed or hopeless Not at all   PHQ-2 Score 0       Abuse: Current or Past(Physical, Sexual or Emotional)- No  Do you feel safe in your environment? Yes    Have you ever done Advance Care Planning? (For example, a Health Directive, POLST, or a discussion with a medical provider or your loved ones about your wishes): No, advance care planning information given to patient to review.  Advanced care planning was discussed at today's visit.    Social History     Tobacco Use     Smoking status: Never Smoker     Smokeless tobacco: Never Used   Substance Use Topics     Alcohol use: Yes     Comment: rarely         Alcohol Use 11/18/2021   Prescreen: >3 drinks/day or >7 drinks/week? No   Prescreen: >3 drinks/day or >7 drinks/week? -       Last PSA: No results found for: PSA    Reviewed orders with patient. Reviewed health maintenance and updated orders accordingly - Yes  Lab work is in process  Labs reviewed in EPIC    Reviewed and updated as needed this visit by clinical staff                Reviewed and updated as needed this visit by Provider                   Review of Systems   Constitutional: Negative for chills and fever.   HENT: Negative for congestion, ear pain, hearing loss and sore throat.    Eyes: Negative for pain and visual disturbance.   Respiratory: Negative for cough and shortness of breath.    Cardiovascular: Negative for chest pain, palpitations and peripheral edema.   Gastrointestinal: Negative for abdominal pain, constipation, diarrhea, heartburn, hematochezia and nausea.   Genitourinary: Positive for frequency. Negative for dysuria, genital sores, hematuria, impotence, penile discharge and urgency.  "  Musculoskeletal: Negative for arthralgias, joint swelling and myalgias.   Skin: Negative for rash.   Neurological: Negative for dizziness, weakness, headaches and paresthesias.   Psychiatric/Behavioral: Negative for mood changes. The patient is not nervous/anxious.        OBJECTIVE:   Blood pressure 134/82, pulse 78, resp. rate 20, height 1.905 m (6' 3\"), weight 94.3 kg (208 lb), SpO2 99 %.      Physical Exam  Vitals and nursing note reviewed.   Constitutional:       Appearance: Normal appearance. He is normal weight.   HENT:      Head: Normocephalic and atraumatic.      Right Ear: Tympanic membrane, ear canal and external ear normal.      Left Ear: Tympanic membrane, ear canal and external ear normal.      Mouth/Throat:      Mouth: Mucous membranes are moist.      Pharynx: Oropharynx is clear.   Eyes:      Conjunctiva/sclera: Conjunctivae normal.      Pupils: Pupils are equal, round, and reactive to light.   Cardiovascular:      Rate and Rhythm: Normal rate and regular rhythm.      Pulses: Normal pulses.      Heart sounds: Normal heart sounds.   Pulmonary:      Effort: Pulmonary effort is normal.      Breath sounds: Normal breath sounds.   Abdominal:      General: Abdomen is flat. Bowel sounds are normal.      Palpations: Abdomen is soft.   Musculoskeletal:         General: Normal range of motion.   Skin:     Capillary Refill: Capillary refill takes less than 2 seconds.      Findings: Lesion (3 separate warts noted on his left hand.  One lesion is located in the central palm, and it is approximately 2 mm in diameter.  The second lesion is located at the base of his palm and it is approximately 2 to 3 mm in diameter.  The final lesion is located) present.   Neurological:      General: No focal deficit present.      Mental Status: He is alert and oriented to person, place, and time. Mental status is at baseline.   Psychiatric:         Mood and Affect: Mood normal.         Behavior: Behavior normal.         Thought " "Content: Thought content normal.         Judgment: Judgment normal.           Diagnostic Test Results: CMP, FLP, and urinalysis are pending.    ASSESSMENT/PLAN:   (Z00.00) Routine history and physical examination of adult  (primary encounter diagnosis)  Comment: *At this time, patient does have an unremarkable physical examination.  His blood pressure is noted to be in acceptable level.  We dismiss him home discussing age-appropriate health maintenance issues.  We did also review dietary and lifestyle modification that can help keep his weight and blood pressure under good control.  Patient is up-to-date in regards to his immunizations.  He will be due for a Covid booster in approximately 1 week.  Patient did submit fasting blood work for labs today.  He did request that we check his urinalysis given his history of frequent urination.**      (B07.9) Viral warts, unspecified type  Comment: Patient did have 3 separate warts noted on his left palm and left index finger.  He did request repeat cryotherapy today.  Each lesion was cleansed with alcohol swab.  This was followed by the application of liquid nitrogen with the spray gun for approximately 8 seconds on each lesion followed by thawing.  This cycle was completed for 3 cycles on each lesion.  We will monitor his response to treatment.      Patient has been advised of split billing requirements and indicates understanding: Yes  COUNSELING:   Reviewed preventive health counseling, as reflected in patient instructions    Estimated body mass index is 25.5 kg/m  as calculated from the following:    Height as of 3/26/21: 1.905 m (6' 3\").    Weight as of 3/26/21: 92.5 kg (204 lb).     Weight management plan: Discussed healthy diet and exercise guidelines    He reports that he has never smoked. He has never used smokeless tobacco.      Counseling Resources:  ATP IV Guidelines  Pooled Cohorts Equation Calculator  FRAX Risk Assessment  ICSI Preventive Guidelines  Dietary " Guidelines for Americans, 2010  USDA's MyPlate  ASA Prophylaxis  Lung CA Screening    Amol Sweeney MD  [facility]

## 2021-11-19 LAB
ALBUMIN SERPL-MCNC: 4.1 G/DL (ref 3.4–5)
ALP SERPL-CCNC: 184 U/L (ref 40–150)
ALT SERPL W P-5'-P-CCNC: 115 U/L (ref 0–70)
ANION GAP SERPL CALCULATED.3IONS-SCNC: 4 MMOL/L (ref 3–14)
AST SERPL W P-5'-P-CCNC: 47 U/L (ref 0–45)
BILIRUB SERPL-MCNC: 0.5 MG/DL (ref 0.2–1.3)
BUN SERPL-MCNC: 13 MG/DL (ref 7–30)
CALCIUM SERPL-MCNC: 9.4 MG/DL (ref 8.5–10.1)
CHLORIDE BLD-SCNC: 105 MMOL/L (ref 94–109)
CHOLEST SERPL-MCNC: 165 MG/DL
CO2 SERPL-SCNC: 28 MMOL/L (ref 20–32)
CREAT SERPL-MCNC: 0.88 MG/DL (ref 0.66–1.25)
FASTING STATUS PATIENT QL REPORTED: YES
GFR SERPL CREATININE-BSD FRML MDRD: >90 ML/MIN/1.73M2
GLUCOSE BLD-MCNC: 92 MG/DL (ref 70–99)
HDLC SERPL-MCNC: 56 MG/DL
LDLC SERPL CALC-MCNC: 99 MG/DL
NONHDLC SERPL-MCNC: 109 MG/DL
POTASSIUM BLD-SCNC: 5.1 MMOL/L (ref 3.4–5.3)
PROT SERPL-MCNC: 8.1 G/DL (ref 6.8–8.8)
SODIUM SERPL-SCNC: 137 MMOL/L (ref 133–144)
TRIGL SERPL-MCNC: 52 MG/DL

## 2022-01-10 DIAGNOSIS — R74.8 ELEVATED LIVER ENZYMES: Primary | ICD-10-CM

## 2022-08-29 ENCOUNTER — OFFICE VISIT (OUTPATIENT)
Dept: INTERNAL MEDICINE | Facility: CLINIC | Age: 32
End: 2022-08-29
Payer: COMMERCIAL

## 2022-08-29 VITALS
DIASTOLIC BLOOD PRESSURE: 88 MMHG | RESPIRATION RATE: 18 BRPM | BODY MASS INDEX: 26.73 KG/M2 | HEART RATE: 66 BPM | OXYGEN SATURATION: 99 % | TEMPERATURE: 98.2 F | HEIGHT: 75 IN | SYSTOLIC BLOOD PRESSURE: 128 MMHG | WEIGHT: 215 LBS

## 2022-08-29 DIAGNOSIS — Z13.220 SCREENING FOR HYPERLIPIDEMIA: ICD-10-CM

## 2022-08-29 DIAGNOSIS — B07.9 VIRAL WARTS, UNSPECIFIED TYPE: ICD-10-CM

## 2022-08-29 DIAGNOSIS — L91.8 SKIN TAG: ICD-10-CM

## 2022-08-29 DIAGNOSIS — J30.9 ALLERGIC RHINITIS, UNSPECIFIED SEASONALITY, UNSPECIFIED TRIGGER: ICD-10-CM

## 2022-08-29 DIAGNOSIS — Z00.00 ANNUAL PHYSICAL EXAM: Primary | ICD-10-CM

## 2022-08-29 LAB
ANION GAP SERPL CALCULATED.3IONS-SCNC: 5 MMOL/L (ref 3–14)
BASOPHILS # BLD AUTO: 0.1 10E3/UL (ref 0–0.2)
BASOPHILS NFR BLD AUTO: 1 %
BUN SERPL-MCNC: 13 MG/DL (ref 7–30)
CALCIUM SERPL-MCNC: 9.4 MG/DL (ref 8.5–10.1)
CHLORIDE BLD-SCNC: 105 MMOL/L (ref 94–109)
CHOLEST SERPL-MCNC: 171 MG/DL
CO2 SERPL-SCNC: 27 MMOL/L (ref 20–32)
CREAT SERPL-MCNC: 1 MG/DL (ref 0.66–1.25)
EOSINOPHIL # BLD AUTO: 0.4 10E3/UL (ref 0–0.7)
EOSINOPHIL NFR BLD AUTO: 5 %
ERYTHROCYTE [DISTWIDTH] IN BLOOD BY AUTOMATED COUNT: 12.1 % (ref 10–15)
FASTING STATUS PATIENT QL REPORTED: YES
GFR SERPL CREATININE-BSD FRML MDRD: >90 ML/MIN/1.73M2
GLUCOSE BLD-MCNC: 110 MG/DL (ref 70–99)
HCT VFR BLD AUTO: 47.1 % (ref 40–53)
HDLC SERPL-MCNC: 53 MG/DL
HGB BLD-MCNC: 15.6 G/DL (ref 13.3–17.7)
IMM GRANULOCYTES # BLD: 0 10E3/UL
IMM GRANULOCYTES NFR BLD: 0 %
LDLC SERPL CALC-MCNC: 100 MG/DL
LYMPHOCYTES # BLD AUTO: 2.3 10E3/UL (ref 0.8–5.3)
LYMPHOCYTES NFR BLD AUTO: 30 %
MCH RBC QN AUTO: 29.1 PG (ref 26.5–33)
MCHC RBC AUTO-ENTMCNC: 33.1 G/DL (ref 31.5–36.5)
MCV RBC AUTO: 88 FL (ref 78–100)
MONOCYTES # BLD AUTO: 0.7 10E3/UL (ref 0–1.3)
MONOCYTES NFR BLD AUTO: 9 %
NEUTROPHILS # BLD AUTO: 4.3 10E3/UL (ref 1.6–8.3)
NEUTROPHILS NFR BLD AUTO: 56 %
NONHDLC SERPL-MCNC: 118 MG/DL
PLATELET # BLD AUTO: 216 10E3/UL (ref 150–450)
POTASSIUM BLD-SCNC: 4 MMOL/L (ref 3.4–5.3)
RBC # BLD AUTO: 5.37 10E6/UL (ref 4.4–5.9)
SODIUM SERPL-SCNC: 137 MMOL/L (ref 133–144)
TRIGL SERPL-MCNC: 91 MG/DL
WBC # BLD AUTO: 7.7 10E3/UL (ref 4–11)

## 2022-08-29 PROCEDURE — 80061 LIPID PANEL: CPT | Performed by: INTERNAL MEDICINE

## 2022-08-29 PROCEDURE — 99395 PREV VISIT EST AGE 18-39: CPT | Mod: 25 | Performed by: INTERNAL MEDICINE

## 2022-08-29 PROCEDURE — 11200 RMVL SKIN TAGS UP TO&INC 15: CPT | Mod: 59 | Performed by: INTERNAL MEDICINE

## 2022-08-29 PROCEDURE — 36415 COLL VENOUS BLD VENIPUNCTURE: CPT | Performed by: INTERNAL MEDICINE

## 2022-08-29 PROCEDURE — 85025 COMPLETE CBC W/AUTO DIFF WBC: CPT | Performed by: INTERNAL MEDICINE

## 2022-08-29 PROCEDURE — 17110 DESTRUCTION B9 LES UP TO 14: CPT | Performed by: INTERNAL MEDICINE

## 2022-08-29 PROCEDURE — 80048 BASIC METABOLIC PNL TOTAL CA: CPT | Performed by: INTERNAL MEDICINE

## 2022-08-29 ASSESSMENT — ENCOUNTER SYMPTOMS
HEARTBURN: 0
SORE THROAT: 0
CHILLS: 0
FREQUENCY: 1
WEAKNESS: 0
DIZZINESS: 0
SHORTNESS OF BREATH: 0
PARESTHESIAS: 0
NAUSEA: 0
EYE PAIN: 0
ABDOMINAL PAIN: 0
HEMATOCHEZIA: 0
PALPITATIONS: 0
COUGH: 0
CONSTIPATION: 0
JOINT SWELLING: 0
NERVOUS/ANXIOUS: 0
HEMATURIA: 0
FEVER: 0
DIARRHEA: 0
DYSURIA: 0
HEADACHES: 0
MYALGIAS: 0
ARTHRALGIAS: 0

## 2022-08-29 NOTE — PROGRESS NOTES
SUBJECTIVE:   CC: Raudel Duran is an 31 year old male who presents for preventative health visit.       Patient has been advised of split billing requirements and indicates understanding: Yes  Patient is a 31-year-old  male who presents to the clinic for his annual physical.  He does have a few concerns that he would like to address today.  Patient states that he has 2 skin tags that he would like he might have removed.  They have causing some irritation when changing his clothes.  1 Lesion Is Located Just Superior to His Left Clavicle, and the Other Lesion Is Located in His Right Axilla.  They Have Been Present for an Extended Period of Time.  He Also Has a Prominent Wart Located along the Lateral Aspect of His Left Index Finger Adjacent to the PIP Joint.  He Has 2 Small Warts That Are Very Minuscule in Size Located on His Left Middle and Right Index Finger.  Otherwise, patient is doing well.  He reports that left eye.  He is doing well without issue.  He sleeps well at night.  He does have some issues with persistent allergic rhinitis that does not always respond well to other antihistamines.  Patient has been on Singulair and Zyrtec, unfortunately have not been very helpful.  He would like to see an allergist at this time.    Healthy Habits:     Getting at least 3 servings of Calcium per day:  Yes    Bi-annual eye exam:  NO    Dental care twice a year:  Yes    Sleep apnea or symptoms of sleep apnea:  None    Diet:  Regular (no restrictions)    Frequency of exercise:  4-5 days/week    Duration of exercise:  45-60 minutes    Taking medications regularly:  Yes    Medication side effects:  None    PHQ-2 Total Score: 0    Additional concerns today:  Yes    Ability to successfully perform activities of daily living: Yes, no assistance needed  Home safety:  none identified   Hearing impairment: No            Today's PHQ-2 Score:   PHQ-2 ( 1999 Pfizer) 8/29/2022   Q1: Little interest or pleasure in doing  things 0   Q2: Feeling down, depressed or hopeless 0   PHQ-2 Score 0   PHQ-2 Total Score (12-17 Years)- Positive if 3 or more points; Administer PHQ-A if positive -   Q1: Little interest or pleasure in doing things Not at all   Q2: Feeling down, depressed or hopeless Not at all   PHQ-2 Score 0       Abuse: Current or Past(Physical, Sexual or Emotional)- No  Do you feel safe in your environment? Yes        Social History     Tobacco Use     Smoking status: Never Smoker     Smokeless tobacco: Never Used   Substance Use Topics     Alcohol use: Yes     Comment: rarely         Alcohol Use 8/29/2022   Prescreen: >3 drinks/day or >7 drinks/week? No   Prescreen: >3 drinks/day or >7 drinks/week? -       Last PSA: No results found for: PSA    Reviewed orders with patient. Reviewed health maintenance and updated orders accordingly - Yes  Lab work is in process    Reviewed and updated as needed this visit by clinical staff                    Reviewed and updated as needed this visit by Provider                       Review of Systems   Constitutional: Negative for chills and fever.   HENT: Negative for congestion, ear pain, hearing loss and sore throat.    Eyes: Negative for pain and visual disturbance.   Respiratory: Negative for cough and shortness of breath.    Cardiovascular: Negative for chest pain, palpitations and peripheral edema.   Gastrointestinal: Negative for abdominal pain, constipation, diarrhea, heartburn, hematochezia and nausea.   Genitourinary: Negative for dysuria, genital sores, hematuria, impotence, penile discharge and urgency.   Musculoskeletal: Negative for arthralgias, joint swelling and myalgias.   Skin: Negative for rash.   Neurological: Negative for dizziness, weakness, headaches and paresthesias.   Psychiatric/Behavioral: Negative for mood changes. The patient is not nervous/anxious.        OBJECTIVE:   Blood pressure 128/88, pulse 66, temperature 98.2  F (36.8  C), resp. rate 18, height 1.905 m (6'  "3\"), weight 97.5 kg (215 lb), SpO2 99 %.        Physical Exam  Vitals reviewed.   Constitutional:       General: He is not in acute distress.     Appearance: He is well-developed.   HENT:      Head: Normocephalic.      Right Ear: Tympanic membrane and external ear normal.      Left Ear: Tympanic membrane and external ear normal.      Nose: Nose normal.      Mouth/Throat:      Mouth: Mucous membranes are moist. No oral lesions.      Pharynx: Oropharynx is clear. No oropharyngeal exudate.   Eyes:      General:         Right eye: No discharge.         Left eye: No discharge.      Conjunctiva/sclera: Conjunctivae normal.      Pupils: Pupils are equal, round, and reactive to light.   Neck:      Thyroid: No thyromegaly.      Trachea: No tracheal deviation.   Cardiovascular:      Rate and Rhythm: Normal rate and regular rhythm.      Pulses: Normal pulses.      Heart sounds: Normal heart sounds, S1 normal and S2 normal. No murmur heard.    No S3 or S4 sounds.   Pulmonary:      Effort: Pulmonary effort is normal. No respiratory distress.      Breath sounds: Normal breath sounds. No wheezing or rales.   Abdominal:      General: Bowel sounds are normal.      Palpations: Abdomen is soft. There is no mass.      Tenderness: There is no abdominal tenderness.   Musculoskeletal:         General: No deformity. Normal range of motion.      Cervical back: Neck supple.   Lymphadenopathy:      Cervical: No cervical adenopathy.   Skin:     General: Skin is warm and dry.      Findings: Lesion (A small skin tag was noted over his left clavicle.  He was approximately 2 mm in diameter.  He had a similar sized skin tag noted in his right axilla.  Patient did have a prominent verruca.  Wart located on the lateral aspect of his left index finger.) present. No rash.      Comments: 2 pinpoint sized warts were noted on the patient.  1 lesion was located  The tip of his right index finger.  The second lesion was noted on the middle finger of his " left hand.   Neurological:      Mental Status: He is alert and oriented to person, place, and time.      Motor: No abnormal muscle tone.      Deep Tendon Reflexes: Reflexes are normal and symmetric.   Psychiatric:         Speech: Speech normal.         Thought Content: Thought content normal.         Judgment: Judgment normal.       Diagnostic Test Results: CBC, BMP, and FLP are pending.    ASSESSMENT/PLAN:   (Z00.00) Annual physical exam  (primary encounter diagnosis)  Comment: At this time, patient has a relatively unremarkable physical examination.  His blood pressure is noted to be in acceptable level.  We discussed  appropriate dietary and lifestyle modifications necessary to keep his weight and blood pressure under good control.  Fasting labs are pending.  All health maintenance items were addressed    (B07.9) Viral warts, unspecified type  Comment: Patient did have 3 warts noted on the fingers on his bilateral upper extremities.  We did elect to proceed with cryotherapy.  Each lesion was cleansed with an alcohol swab.  This was followed by application of liquid nitrogen for approximately 8 to 10 seconds with subsequent following.  This cycle was repeated for 3 rounds of each lesion.  Patient tolerated the procedure without issue.  We will monitor his response to treatment    (L91.8) Skin tag  Comment: Patient did have 2 small skin tags that were noted on his lymph supraclavicular region in his right axilla.  We did cleanse each lesion with an alcohol swab.  Using a sterile forcep, the skin tag was lifted upwards, and the lesion previously removed with a set of scissors.  No bleeding was noted.    (J30.9) Allergic rhinitis, unspecified seasonality, unspecified trigger  Comment: Allergy referral for persistent symptoms related to his allergic rhinitis has been placed    (Z13.220) Screening for hyperlipidemia  Comment: Lipid panel reflex to direct LDL Fasting is pending.      Patient has been advised of split  "billing requirements and indicates understanding: Yes    COUNSELING:   Reviewed preventive health counseling, as reflected in patient instructions    Estimated body mass index is 26 kg/m  as calculated from the following:    Height as of 11/18/21: 1.905 m (6' 3\").    Weight as of 11/18/21: 94.3 kg (208 lb).     Weight management plan: Discussed healthy diet and exercise guidelines    He reports that he has never smoked. He has never used smokeless tobacco.      Counseling Resources:  ATP IV Guidelines  Pooled Cohorts Equation Calculator  FRAX Risk Assessment  ICSI Preventive Guidelines  Dietary Guidelines for Americans, 2010  USDA's MyPlate  ASA Prophylaxis  Lung CA Screening    Amol Sweeney MD  [facility]  "

## 2022-09-18 ENCOUNTER — HEALTH MAINTENANCE LETTER (OUTPATIENT)
Age: 32
End: 2022-09-18

## 2022-11-16 ENCOUNTER — MYC MEDICAL ADVICE (OUTPATIENT)
Dept: ALLERGY | Facility: CLINIC | Age: 32
End: 2022-11-16

## 2022-11-17 ENCOUNTER — OFFICE VISIT (OUTPATIENT)
Dept: ALLERGY | Facility: CLINIC | Age: 32
End: 2022-11-17
Attending: INTERNAL MEDICINE
Payer: COMMERCIAL

## 2022-11-17 VITALS
SYSTOLIC BLOOD PRESSURE: 130 MMHG | OXYGEN SATURATION: 98 % | DIASTOLIC BLOOD PRESSURE: 80 MMHG | HEART RATE: 77 BPM | BODY MASS INDEX: 26.89 KG/M2 | WEIGHT: 215.1 LBS

## 2022-11-17 DIAGNOSIS — J30.9 ALLERGIC RHINITIS, UNSPECIFIED SEASONALITY, UNSPECIFIED TRIGGER: ICD-10-CM

## 2022-11-17 DIAGNOSIS — R74.8 ELEVATED LIVER ENZYMES: ICD-10-CM

## 2022-11-17 DIAGNOSIS — J30.1 SEASONAL ALLERGIC RHINITIS DUE TO POLLEN: Primary | ICD-10-CM

## 2022-11-17 DIAGNOSIS — J30.81 ALLERGIC RHINITIS DUE TO ANIMAL (CAT) (DOG) HAIR AND DANDER: ICD-10-CM

## 2022-11-17 PROCEDURE — 99204 OFFICE O/P NEW MOD 45 MIN: CPT | Performed by: INTERNAL MEDICINE

## 2022-11-17 RX ORDER — MONTELUKAST SODIUM 10 MG/1
10 TABLET ORAL AT BEDTIME
Qty: 30 TABLET | Refills: 11 | Status: SHIPPED | OUTPATIENT
Start: 2022-11-17

## 2022-11-17 ASSESSMENT — ENCOUNTER SYMPTOMS
FACIAL SWELLING: 0
ADENOPATHY: 0
FEVER: 0
DIARRHEA: 0
HEADACHES: 0
NAUSEA: 0
FATIGUE: 0
ACTIVITY CHANGE: 0
ARTHRALGIAS: 0
RHINORRHEA: 0
SHORTNESS OF BREATH: 0
MYALGIAS: 0
JOINT SWELLING: 0
EYE DISCHARGE: 0
VOMITING: 0
CHEST TIGHTNESS: 0
COUGH: 0
EYE REDNESS: 0
WHEEZING: 0
EYE ITCHING: 0
SINUS PRESSURE: 0

## 2022-11-17 NOTE — PROGRESS NOTES
Raudel Duran was seen in the Allergy Clinic at [facility].    Raudel Duran is a 32 year old male being seen today at the request of Amol Sweeney MD / [facility] in consultation for Allergic rhinitis, unspecified seasonality.    Buddy has postnasal drainage with throat clearing.  Previously had testing which was positive to ragweed, and cat.  He does have cats.  He also has a diagnosis of eosinophilic esophagitis.    In the past he responded to Singulair or Zyrtec he believes.  However he has not had montelukast or Singulair for some time and would like to retry that.  He is primarily here is because his wife does not like his persistent throat clearing.    He has been on proton pump inhibitors in the past but he developed a side effect of elevated liver function tests.  He has subsequently had his gallbladder removed.    He would like to repeat liver function testing.    At today's appointment he is not interested in repeat skin testing nor is he interested in allergy shots.  He primarily wants to retry the montelukast or Singulair to see if that provides benefit.      History reviewed. No pertinent past medical history.  Family History   Problem Relation Age of Onset     Hypertension Father      Cerebrovascular Disease Maternal Grandmother      No Known Problems Mother      Colon Cancer No family hx of      Past Surgical History:   Procedure Laterality Date     ESOPHAGOSCOPY, GASTROSCOPY, DUODENOSCOPY (EGD), COMBINED  2017    hx Sen's Esophagus hx ulcers in [place]     ESOPHAGOSCOPY, GASTROSCOPY, DUODENOSCOPY (EGD), COMBINED  10/21/33384    [facility]     ESOPHAGOSCOPY, GASTROSCOPY, DUODENOSCOPY (EGD), COMBINED N/A 10/21/2019    Procedure: ESOPHAGOGASTRODUODENOSCOPY, WITH BIOPSY with biopsies by cold forceps;  Surgeon: Regina Granados MD;  Location: [facility]     ESOPHAGOSCOPY, GASTROSCOPY, DUODENOSCOPY (EGD), COMBINED N/A 3/26/2021    Procedure:  ESOPHAGOGASTRODUODENOSCOPY, WITH BIOPSies using cold forceps;  Surgeon: Zen Irby MD;  Location:  GI     LAPAROSCOPIC CHOLECYSTECTOMY N/A 9/21/2019    Procedure: CHOLECYSTECTOMY, LAPAROSCOPIC;  Surgeon: Nicolas Chandra MD;  Location:  OR       ENVIRONMENTAL HISTORY:   Pets inside the house include 2 cat(s).  Do you smoke cigarettes or other recreational drugs? No There is/are 0 smokers living in the house. The house does not have a damp basement.     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      Review of Systems   Constitutional: Negative for activity change, fatigue and fever.   HENT: Positive for postnasal drip. Negative for congestion, dental problem, ear pain, facial swelling, nosebleeds, rhinorrhea, sinus pressure and sneezing.    Eyes: Negative for discharge, redness and itching.   Respiratory: Negative for cough, chest tightness, shortness of breath and wheezing.    Cardiovascular: Negative for chest pain.   Gastrointestinal: Negative for diarrhea, nausea and vomiting.   Musculoskeletal: Negative for arthralgias, joint swelling and myalgias.   Skin: Negative for rash.   Neurological: Negative for headaches.   Hematological: Negative for adenopathy.   Psychiatric/Behavioral: Negative for behavioral problems and self-injury.         Current Outpatient Medications:      montelukast (SINGULAIR) 10 MG tablet, Take 1 tablet (10 mg) by mouth At Bedtime, Disp: 30 tablet, Rfl: 11  Allergies   Allergen Reactions     Food Itching     History of bananas, avocados     Seasonal Allergies      Spring and summer         EXAM:   /80   Pulse 77   Wt 97.6 kg (215 lb 1.6 oz)   SpO2 98%   BMI 26.89 kg/m      Physical Exam  Constitutional:       General: He is not in acute distress.     Appearance: Normal appearance. He is not ill-appearing.   HENT:      Head: Normocephalic and atraumatic.      Nose: Nose normal. No congestion or rhinorrhea.      Mouth/Throat:       Mouth: He has posterior oropharyngeal erythema with cobblestoning  Eyes:      General:         Right eye: No discharge.         Left eye: No discharge.   Cardiovascular:      Rate and Rhythm: Normal rate and regular rhythm.      Heart sounds: Normal heart sounds.   Pulmonary:      Effort: Pulmonary effort is normal.      Breath sounds: Normal breath sounds. No wheezing or rhonchi.   Skin:     General: Skin is warm.      Findings: No erythema or rash.   Neurological:      General: No focal deficit present.      Mental Status: He is alert. Mental status is at baseline.   Psychiatric:         Mood and Affect: Mood normal.         Behavior: Behavior normal.       ASSESSMENT/PLAN:  Raudel Duran is a 32 year old male seen today for allergic rhinitis with a chief complaint of postnasal drainage with throat clearing.  He also has eosinophilic esophagitis.  He developed side effects of proton pump inhibitors which he does not want to repeat.  He would like to restart montelukast.    1. Start Zyrtec 10 mg at night.  If not helping add Montelukast 10 mg at night.  2. Consider repeat skin testing if considering shots  3. Flonase Sensimist nasal spray  4. Throat clearing - Consider [facility] clinic referral if not improving    Follow-up as needed      Thank you for allowing me to participate in the care of Raudel Duran.      I spent 30 minutes on the date of the encounter doing chart review, history and exam, documentation and further coordination as noted above exclusive of separately reported interpretations    Harrison Rees MD  Allergy/Immunology  [facility]

## 2022-11-17 NOTE — PATIENT INSTRUCTIONS
Start Zyrtec 10 mg at night.  If not helping add Montelukast 10 mg at night.  Consider repeat skin testing if considering shots  Flonase Sensimist nasal spray  Throat clearing - Consider [facility] referral if not improving        Allergy Staff Appt Hours Shot Hours Location         Physician   Harrison Rees MD      Support Staff   Marilu THOMAS, RN   Danial ANTONY, RN   Petey MARY, MA   Leroy BARAJAS, LPN          Mondays  Not available until January/2023 Wednesdays  Close    Tuesdays Thursdays and Fridays:  [place] 7-5                    [place]     Tuesdays: 7:40-3:20      Fridays: 7:40-4:20                [facility]  [address]  [place] [address]  Appt Line: [phone]    Pulmonary Function Scheduling:  [place]: [phone]

## 2022-11-17 NOTE — LETTER
11/17/2022         RE: Raudel Duran  [address] [facility] [address]        Dear Colleague,    Thank you for referring your patient, Raudel Duran, to the [facility] [place]. Please see a copy of my visit note below.    Raudel Duran was seen in the Allergy Clinic at [facility].    Raudel Duran is a 32 year old male being seen today at the request of Amol Sweeney MD / [facility] in consultation for Allergic rhinitis, unspecified seasonality.    Buddy has postnasal drainage with throat clearing.  Previously had testing which was positive to ragweed, and cat.  He does have cats.  He also has a diagnosis of eosinophilic esophagitis.    In the past he responded to Singulair or Zyrtec he believes.  However he has not had montelukast or Singulair for some time and would like to retry that.  He is primarily here is because his wife does not like his persistent throat clearing.    He has been on proton pump inhibitors in the past but he developed a side effect of elevated liver function tests.  He has subsequently had his gallbladder removed.    He would like to repeat liver function testing.    At today's appointment he is not interested in repeat skin testing nor is he interested in allergy shots.  He primarily wants to retry the montelukast or Singulair to see if that provides benefit.      History reviewed. No pertinent past medical history.  Family History   Problem Relation Age of Onset     Hypertension Father      Cerebrovascular Disease Maternal Grandmother      No Known Problems Mother      Colon Cancer No family hx of      Past Surgical History:   Procedure Laterality Date     ESOPHAGOSCOPY, GASTROSCOPY, DUODENOSCOPY (EGD), COMBINED  2017    hx Sen's Esophagus hx ulcers in [place]     ESOPHAGOSCOPY, GASTROSCOPY, DUODENOSCOPY (EGD), COMBINED  10/21/99981    Dr. Darwin PIEDRA     ESOPHAGOSCOPY, GASTROSCOPY, DUODENOSCOPY  (EGD), COMBINED N/A 10/21/2019    Procedure: ESOPHAGOGASTRODUODENOSCOPY, WITH BIOPSY with biopsies by cold forceps;  Surgeon: Regina Grnaados MD;  Location:  GI     ESOPHAGOSCOPY, GASTROSCOPY, DUODENOSCOPY (EGD), COMBINED N/A 3/26/2021    Procedure: ESOPHAGOGASTRODUODENOSCOPY, WITH BIOPSies using cold forceps;  Surgeon: Zen Irby MD;  Location:  GI     LAPAROSCOPIC CHOLECYSTECTOMY N/A 9/21/2019    Procedure: CHOLECYSTECTOMY, LAPAROSCOPIC;  Surgeon: Nicolas Chandra MD;  Location:  OR       ENVIRONMENTAL HISTORY:   Pets inside the house include 2 cat(s).  Do you smoke cigarettes or other recreational drugs? No There is/are 0 smokers living in the house. The house does not have a damp basement.     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      Review of Systems   Constitutional: Negative for activity change, fatigue and fever.   HENT: Positive for postnasal drip. Negative for congestion, dental problem, ear pain, facial swelling, nosebleeds, rhinorrhea, sinus pressure and sneezing.    Eyes: Negative for discharge, redness and itching.   Respiratory: Negative for cough, chest tightness, shortness of breath and wheezing.    Cardiovascular: Negative for chest pain.   Gastrointestinal: Negative for diarrhea, nausea and vomiting.   Musculoskeletal: Negative for arthralgias, joint swelling and myalgias.   Skin: Negative for rash.   Neurological: Negative for headaches.   Hematological: Negative for adenopathy.   Psychiatric/Behavioral: Negative for behavioral problems and self-injury.         Current Outpatient Medications:      montelukast (SINGULAIR) 10 MG tablet, Take 1 tablet (10 mg) by mouth At Bedtime, Disp: 30 tablet, Rfl: 11  Allergies   Allergen Reactions     Food Itching     History of bananas, avocados     Seasonal Allergies      Spring and summer         EXAM:   /80   Pulse 77   Wt 97.6 kg (215 lb 1.6 oz)   SpO2 98%   BMI 26.89 kg/m       Physical Exam  Constitutional:       General: He is not in acute distress.     Appearance: Normal appearance. He is not ill-appearing.   HENT:      Head: Normocephalic and atraumatic.      Nose: Nose normal. No congestion or rhinorrhea.      Mouth/Throat:      Mouth: He has posterior oropharyngeal erythema with cobblestoning  Eyes:      General:         Right eye: No discharge.         Left eye: No discharge.   Cardiovascular:      Rate and Rhythm: Normal rate and regular rhythm.      Heart sounds: Normal heart sounds.   Pulmonary:      Effort: Pulmonary effort is normal.      Breath sounds: Normal breath sounds. No wheezing or rhonchi.   Skin:     General: Skin is warm.      Findings: No erythema or rash.   Neurological:      General: No focal deficit present.      Mental Status: He is alert. Mental status is at baseline.   Psychiatric:         Mood and Affect: Mood normal.         Behavior: Behavior normal.       ASSESSMENT/PLAN:  Raudel Durna is a 32 year old male seen today for allergic rhinitis with a chief complaint of postnasal drainage with throat clearing.  He also has eosinophilic esophagitis.  He developed side effects of proton pump inhibitors which he does not want to repeat.  He would like to restart montelukast.    1. Start Zyrtec 10 mg at night.  If not helping add Montelukast 10 mg at night.  2. Consider repeat skin testing if considering shots  3. Flonase Sensimist nasal spray  4. Throat clearing - Consider [facility] clinic referral if not improving    Follow-up as needed      Thank you for allowing me to participate in the care of Raudel Duran.      I spent 30 minutes on the date of the encounter doing chart review, history and exam, documentation and further coordination as noted above exclusive of separately reported interpretations    Harrison Rees MD  Allergy/Immunology  [facility]        Again, thank you for allowing me to participate in the care of your patient.         Sincerely,        Harrison Rees MD

## 2022-11-30 ENCOUNTER — LAB (OUTPATIENT)
Dept: LAB | Facility: CLINIC | Age: 32
End: 2022-11-30
Payer: COMMERCIAL

## 2022-11-30 ENCOUNTER — MYC MEDICAL ADVICE (OUTPATIENT)
Dept: ALLERGY | Facility: CLINIC | Age: 32
End: 2022-11-30

## 2022-11-30 DIAGNOSIS — R74.8 ELEVATED LIVER ENZYMES: Primary | ICD-10-CM

## 2022-11-30 DIAGNOSIS — R74.8 ELEVATED LIVER ENZYMES: ICD-10-CM

## 2022-11-30 LAB
ALBUMIN SERPL BCG-MCNC: 4.7 G/DL (ref 3.5–5.2)
ALP SERPL-CCNC: 199 U/L (ref 40–129)
ALT SERPL W P-5'-P-CCNC: 111 U/L (ref 10–50)
AST SERPL W P-5'-P-CCNC: 52 U/L (ref 10–50)
BILIRUB DIRECT SERPL-MCNC: <0.2 MG/DL (ref 0–0.3)
BILIRUB SERPL-MCNC: 0.6 MG/DL
PROT SERPL-MCNC: 7.8 G/DL (ref 6.4–8.3)

## 2022-11-30 PROCEDURE — 80076 HEPATIC FUNCTION PANEL: CPT

## 2022-11-30 PROCEDURE — 36415 COLL VENOUS BLD VENIPUNCTURE: CPT

## 2023-04-12 NOTE — TELEPHONE ENCOUNTER
He is on metformin for diabetes.  A1c was 7.3 back in January.  He is on an ACE I but no statin.  He is due for foot exam; deferred as he did not want to take off his compression stockings today.  He is due for eye exam; he wants to set this up for himself.  Checking labs.   Sent message to pt via my chart.     Petey Finch MA

## 2023-10-08 ENCOUNTER — HEALTH MAINTENANCE LETTER (OUTPATIENT)
Age: 33
End: 2023-10-08

## 2023-12-21 ENCOUNTER — OFFICE VISIT (OUTPATIENT)
Dept: FAMILY MEDICINE | Facility: CLINIC | Age: 33
End: 2023-12-21
Payer: COMMERCIAL

## 2023-12-21 VITALS
WEIGHT: 211.4 LBS | HEART RATE: 67 BPM | RESPIRATION RATE: 16 BRPM | DIASTOLIC BLOOD PRESSURE: 96 MMHG | SYSTOLIC BLOOD PRESSURE: 136 MMHG | TEMPERATURE: 97.6 F | HEIGHT: 73 IN | BODY MASS INDEX: 28.02 KG/M2 | OXYGEN SATURATION: 97 %

## 2023-12-21 DIAGNOSIS — R03.0 ELEVATED BLOOD PRESSURE READING WITHOUT DIAGNOSIS OF HYPERTENSION: ICD-10-CM

## 2023-12-21 DIAGNOSIS — K76.0 FATTY LIVER DISEASE, NONALCOHOLIC: ICD-10-CM

## 2023-12-21 DIAGNOSIS — B07.8 OTHER VIRAL WARTS: ICD-10-CM

## 2023-12-21 DIAGNOSIS — Z13.220 SCREENING FOR LIPID DISORDERS: ICD-10-CM

## 2023-12-21 DIAGNOSIS — Z00.00 ROUTINE GENERAL MEDICAL EXAMINATION AT A HEALTH CARE FACILITY: Primary | ICD-10-CM

## 2023-12-21 PROCEDURE — 99395 PREV VISIT EST AGE 18-39: CPT | Mod: 25 | Performed by: PHYSICIAN ASSISTANT

## 2023-12-21 PROCEDURE — 17110 DESTRUCTION B9 LES UP TO 14: CPT | Performed by: PHYSICIAN ASSISTANT

## 2023-12-21 ASSESSMENT — ENCOUNTER SYMPTOMS
EYE PAIN: 0
WEAKNESS: 0
HEADACHES: 0
SHORTNESS OF BREATH: 0
ABDOMINAL PAIN: 0
DIZZINESS: 0
CONSTIPATION: 0
HEMATURIA: 0
MYALGIAS: 0
DIARRHEA: 0
JOINT SWELLING: 0
HEARTBURN: 1
CHILLS: 0
PARESTHESIAS: 0
DYSURIA: 0
NERVOUS/ANXIOUS: 0
ARTHRALGIAS: 0
FREQUENCY: 1
NAUSEA: 0
PALPITATIONS: 0
SORE THROAT: 0
FEVER: 0
HEMATOCHEZIA: 0
COUGH: 0

## 2023-12-21 NOTE — PROGRESS NOTES
"SUBJECTIVE:   Buddy is a 33 year old, presenting for the following:  Physical        12/21/2023    12:36 PM   Additional Questions   Roomed by carrie       Healthy Habits:     Getting at least 3 servings of Calcium per day:  Yes    Bi-annual eye exam:  NO    Dental care twice a year:  Yes    Sleep apnea or symptoms of sleep apnea:  None    Diet:  Regular (no restrictions)    Frequency of exercise:  1 day/week    Duration of exercise:  15-30 minutes    Taking medications regularly:  Yes    Medication side effects:  Not applicable    Additional concerns today:  Yes    Has young kids- hasn't been exercising much.       Hx mildly elev LFTs s/p cholecystectomy 2nd to common bile duct dilation          Social History     Tobacco Use    Smoking status: Never    Smokeless tobacco: Never   Substance Use Topics    Alcohol use: Yes     Comment: rarely             12/21/2023    12:39 PM   Alcohol Use   Prescreen: >3 drinks/day or >7 drinks/week? No       Last PSA: No results found for: \"PSA\"    Reviewed orders with patient. Reviewed health maintenance and updated orders accordingly - Yes      Reviewed and updated as needed this visit by clinical staff   Tobacco  Allergies  Meds              Reviewed and updated as needed this visit by Provider                     Review of Systems   Constitutional:  Negative for chills and fever.   HENT:  Negative for congestion, ear pain, hearing loss and sore throat.    Eyes:  Negative for pain and visual disturbance.   Respiratory:  Negative for cough and shortness of breath.    Cardiovascular:  Negative for chest pain, palpitations and peripheral edema.   Gastrointestinal:  Positive for heartburn. Negative for abdominal pain, constipation, diarrhea, hematochezia and nausea.   Genitourinary:  Positive for frequency. Negative for dysuria, genital sores, hematuria, impotence, penile discharge and urgency.   Musculoskeletal:  Negative for arthralgias, joint swelling and myalgias.   Skin:  " "Negative for rash.   Neurological:  Negative for dizziness, weakness, headaches and paresthesias.   Psychiatric/Behavioral:  Negative for mood changes. The patient is not nervous/anxious.          OBJECTIVE:   BP (!) 136/96   Pulse 67   Temp 97.6  F (36.4  C) (Oral)   Resp 16   Ht 1.854 m (6' 1\")   Wt 95.9 kg (211 lb 6.4 oz)   SpO2 97%   BMI 27.89 kg/m      Physical Exam  GENERAL: healthy, alert and no distress  EYES: Eyes grossly normal to inspection, PERRL and conjunctivae and sclerae normal  HENT: ear canals and TM's normal, nose and mouth without ulcers or lesions  NECK: no adenopathy, no asymmetry, masses, or scars and thyroid normal to palpation  RESP: lungs clear to auscultation - no rales, rhonchi or wheezes  CV: regular rate and rhythm, normal S1 S2, no S3 or S4, no murmur, click or rub,    ABDOMEN: soft, nontender, no hepatosplenomegaly, no masses and bowel sounds normal  MS: no gross musculoskeletal defects noted, no edema  SKIN: 5 mm raised wart proximal left 1st digit  NEURO: Normal strength and tone, mentation intact and speech normal  PSYCH: mentation appears normal, affect normal/bright       ASSESSMENT/PLAN:   Buddy was seen today for physical.    Diagnoses and all orders for this visit:    Routine general medical examination at a health care facility    Fatty liver disease, nonalcoholic  -     Comprehensive metabolic panel; Future  -     Extra Tube; Future  -     CBC with Platelets & Differential; Future    Screening for lipid disorders  -     Lipid Profile; Future    Other viral warts  -     DESTRUCT BENIGN LESION, UP TO 14  -     salicylic acid (MEDIPLAST) 40 % miscellaneous; Apply at bedtime for 4-6 weeks    Elevated blood pressure reading without diagnosis of hypertension  -     TSH with free T4 reflex; Future  -     Albumin Random Urine Quantitative with Creat Ratio; Future    Other orders  -     REVIEW OF HEALTH MAINTENANCE PROTOCOL ORDERS  -     PRIMARY CARE FOLLOW-UP SCHEDULING; " Future      Verbal consent received.  1 Wart(s) treated in typical fashion with liquid nitrogen. Patient tolerated well          COUNSELING:   Reviewed preventive health counseling, as reflected in patient instructions    Dash diet and BP lifestyle mods discussed    He reports that he has never smoked. He has never used smokeless tobacco.          ABRAM Das  [facility]

## 2023-12-21 NOTE — PATIENT INSTRUCTIONS
At [facility], we strive to deliver an exceptional experience to you, every time we see you. If you receive a survey, please complete it as we do value your feedback.  If you have MyChart, you can expect to receive results automatically within 24 hours of their completion.  Your provider will send a note interpreting your results as well.   If you do not have MyChart, you should receive your results in about a week by mail.    Your care team:                            Family Medicine Internal Medicine   MD Nate Barr, MD Leeann Aguilar, MD Seda Barrera, PAELVIRA Schuster, MD Pediatrics   Danial Leiva, PAELVIRA Box, MD Angelica Oneill CNP   EDITH Cifuentes CNP, MD Josi Lundberg, NP coming October 2023 Same-Day (No follow up visit)    CLAUS Helm PA coming Oct 2023     Clinic hours: Monday - Thursday 7 am-6 pm; Fridays 7 am-5 pm.   Urgent care: Monday - Friday 10 am- 8 pm; Saturday and Sunday 9 am-5 pm.    Clinic: [phone]       [facility]: Monday - Thursday 8 am - 7 pm; Friday 8 am - 6 pm  [facility]: [phone]     Preventive Health Recommendations  Male Ages 26 - 39    Yearly exam:             See your health care provider every year in order to  o   Review health changes.   o   Discuss preventive care.    o   Review your medicines if your doctor has prescribed any.  You should be tested each year for STDs (sexually transmitted diseases), if you re at risk.   After age 35, talk to your provider about cholesterol testing. If you are at risk for heart disease, have your cholesterol tested at least every 5 years.   If you are at risk for diabetes, you should have a diabetes test (fasting glucose).  Shots: Get a flu shot each year. Get a tetanus shot every  10 years.     Nutrition:  Eat at least 5 servings of fruits and vegetables daily.   Eat whole-grain bread, whole-wheat pasta and brown rice instead of white grains and rice.   Get adequate Calcium and Vitamin D.     Lifestyle  Exercise for at least 150 minutes a week (30 minutes a day, 5 days a week). This will help you control your weight and prevent disease.   Limit alcohol to one drink per day.   No smoking.   Wear sunscreen to prevent skin cancer.   See your dentist every six months for an exam and cleaning.

## 2023-12-27 ENCOUNTER — LAB (OUTPATIENT)
Dept: LAB | Facility: CLINIC | Age: 33
End: 2023-12-27
Payer: COMMERCIAL

## 2023-12-27 DIAGNOSIS — Z13.220 SCREENING FOR LIPID DISORDERS: ICD-10-CM

## 2023-12-27 DIAGNOSIS — R73.09 ELEVATED GLUCOSE: Primary | ICD-10-CM

## 2023-12-27 DIAGNOSIS — K76.0 FATTY LIVER DISEASE, NONALCOHOLIC: ICD-10-CM

## 2023-12-27 DIAGNOSIS — R03.0 ELEVATED BLOOD PRESSURE READING WITHOUT DIAGNOSIS OF HYPERTENSION: ICD-10-CM

## 2023-12-27 LAB
ALBUMIN SERPL BCG-MCNC: 4.5 G/DL (ref 3.5–5.2)
ALP SERPL-CCNC: 208 U/L (ref 40–150)
ALT SERPL W P-5'-P-CCNC: 79 U/L (ref 0–70)
ANION GAP SERPL CALCULATED.3IONS-SCNC: 9 MMOL/L (ref 7–15)
AST SERPL W P-5'-P-CCNC: 36 U/L (ref 0–45)
BASOPHILS # BLD AUTO: 0.1 10E3/UL (ref 0–0.2)
BASOPHILS NFR BLD AUTO: 1 %
BILIRUB SERPL-MCNC: 0.4 MG/DL
BUN SERPL-MCNC: 14.8 MG/DL (ref 6–20)
CALCIUM SERPL-MCNC: 9.7 MG/DL (ref 8.6–10)
CHLORIDE SERPL-SCNC: 103 MMOL/L (ref 98–107)
CHOLEST SERPL-MCNC: 128 MG/DL
CREAT SERPL-MCNC: 0.9 MG/DL (ref 0.67–1.17)
CREAT UR-MCNC: 112 MG/DL
DEPRECATED HCO3 PLAS-SCNC: 29 MMOL/L (ref 22–29)
EGFRCR SERPLBLD CKD-EPI 2021: >90 ML/MIN/1.73M2
EOSINOPHIL # BLD AUTO: 0.4 10E3/UL (ref 0–0.7)
EOSINOPHIL NFR BLD AUTO: 5 %
ERYTHROCYTE [DISTWIDTH] IN BLOOD BY AUTOMATED COUNT: 12.7 % (ref 10–15)
FASTING STATUS PATIENT QL REPORTED: YES
GLUCOSE SERPL-MCNC: 102 MG/DL (ref 70–99)
HBA1C MFR BLD: 5.5 % (ref 0–5.6)
HCT VFR BLD AUTO: 46 % (ref 40–53)
HDLC SERPL-MCNC: 40 MG/DL
HGB BLD-MCNC: 15.7 G/DL (ref 13.3–17.7)
HOLD SPECIMEN: NORMAL
IMM GRANULOCYTES # BLD: 0 10E3/UL
IMM GRANULOCYTES NFR BLD: 0 %
LDLC SERPL CALC-MCNC: 77 MG/DL
LYMPHOCYTES # BLD AUTO: 2.6 10E3/UL (ref 0.8–5.3)
LYMPHOCYTES NFR BLD AUTO: 29 %
MCH RBC QN AUTO: 29.4 PG (ref 26.5–33)
MCHC RBC AUTO-ENTMCNC: 34.1 G/DL (ref 31.5–36.5)
MCV RBC AUTO: 86 FL (ref 78–100)
MICROALBUMIN UR-MCNC: <12 MG/L
MICROALBUMIN/CREAT UR: NORMAL MG/G{CREAT}
MONOCYTES # BLD AUTO: 0.9 10E3/UL (ref 0–1.3)
MONOCYTES NFR BLD AUTO: 10 %
NEUTROPHILS # BLD AUTO: 5.1 10E3/UL (ref 1.6–8.3)
NEUTROPHILS NFR BLD AUTO: 55 %
NONHDLC SERPL-MCNC: 88 MG/DL
NRBC # BLD AUTO: 0 10E3/UL
NRBC BLD AUTO-RTO: 0 /100
PLATELET # BLD AUTO: 236 10E3/UL (ref 150–450)
POTASSIUM SERPL-SCNC: 4.7 MMOL/L (ref 3.4–5.3)
PROT SERPL-MCNC: 8.1 G/DL (ref 6.4–8.3)
RBC # BLD AUTO: 5.34 10E6/UL (ref 4.4–5.9)
SODIUM SERPL-SCNC: 141 MMOL/L (ref 135–145)
T4 FREE SERPL-MCNC: 1.1 NG/DL (ref 0.9–1.7)
TRIGL SERPL-MCNC: 56 MG/DL
TSH SERPL DL<=0.005 MIU/L-ACNC: 6.17 UIU/ML (ref 0.3–4.2)
WBC # BLD AUTO: 9.1 10E3/UL (ref 4–11)

## 2023-12-27 PROCEDURE — 36415 COLL VENOUS BLD VENIPUNCTURE: CPT

## 2023-12-27 PROCEDURE — 82043 UR ALBUMIN QUANTITATIVE: CPT

## 2023-12-27 PROCEDURE — 83036 HEMOGLOBIN GLYCOSYLATED A1C: CPT

## 2023-12-27 PROCEDURE — 82570 ASSAY OF URINE CREATININE: CPT

## 2023-12-27 PROCEDURE — 85025 COMPLETE CBC W/AUTO DIFF WBC: CPT

## 2023-12-27 PROCEDURE — 80061 LIPID PANEL: CPT

## 2023-12-27 PROCEDURE — 80053 COMPREHEN METABOLIC PANEL: CPT

## 2023-12-27 PROCEDURE — 84439 ASSAY OF FREE THYROXINE: CPT

## 2023-12-27 PROCEDURE — 84443 ASSAY THYROID STIM HORMONE: CPT

## 2023-12-28 ENCOUNTER — TELEPHONE (OUTPATIENT)
Dept: FAMILY MEDICINE | Facility: CLINIC | Age: 33
End: 2023-12-28
Payer: COMMERCIAL

## 2023-12-28 DIAGNOSIS — R94.6 ABNORMAL FINDING ON THYROID FUNCTION TEST: Primary | ICD-10-CM

## 2024-11-05 ENCOUNTER — OFFICE VISIT (OUTPATIENT)
Dept: FAMILY MEDICINE | Facility: CLINIC | Age: 34
End: 2024-11-05
Payer: COMMERCIAL

## 2024-11-05 VITALS
HEIGHT: 73 IN | OXYGEN SATURATION: 96 % | BODY MASS INDEX: 28.6 KG/M2 | TEMPERATURE: 97.7 F | DIASTOLIC BLOOD PRESSURE: 83 MMHG | WEIGHT: 215.8 LBS | RESPIRATION RATE: 17 BRPM | SYSTOLIC BLOOD PRESSURE: 127 MMHG | HEART RATE: 68 BPM

## 2024-11-05 DIAGNOSIS — H61.21 IMPACTED CERUMEN OF RIGHT EAR: ICD-10-CM

## 2024-11-05 DIAGNOSIS — R42 LIGHTHEADEDNESS: Primary | ICD-10-CM

## 2024-11-05 DIAGNOSIS — Z23 NEED FOR INFLUENZA VACCINATION: ICD-10-CM

## 2024-11-05 DIAGNOSIS — Z23 NEED FOR COVID-19 VACCINE: ICD-10-CM

## 2024-11-05 LAB
ALBUMIN SERPL BCG-MCNC: 4.5 G/DL (ref 3.5–5.2)
ALP SERPL-CCNC: 201 U/L (ref 40–150)
ALT SERPL W P-5'-P-CCNC: 122 U/L (ref 0–70)
ANION GAP SERPL CALCULATED.3IONS-SCNC: 12 MMOL/L (ref 7–15)
AST SERPL W P-5'-P-CCNC: 58 U/L (ref 0–45)
BASOPHILS # BLD AUTO: 0.1 10E3/UL (ref 0–0.2)
BASOPHILS NFR BLD AUTO: 1 %
BILIRUB SERPL-MCNC: 0.5 MG/DL
BUN SERPL-MCNC: 13.3 MG/DL (ref 6–20)
CALCIUM SERPL-MCNC: 9.8 MG/DL (ref 8.8–10.4)
CHLORIDE SERPL-SCNC: 102 MMOL/L (ref 98–107)
CREAT SERPL-MCNC: 1.02 MG/DL (ref 0.67–1.17)
EGFRCR SERPLBLD CKD-EPI 2021: >90 ML/MIN/1.73M2
EOSINOPHIL # BLD AUTO: 0.3 10E3/UL (ref 0–0.7)
EOSINOPHIL NFR BLD AUTO: 4 %
ERYTHROCYTE [DISTWIDTH] IN BLOOD BY AUTOMATED COUNT: 12.1 % (ref 10–15)
EST. AVERAGE GLUCOSE BLD GHB EST-MCNC: 108 MG/DL
GLUCOSE SERPL-MCNC: 98 MG/DL (ref 70–99)
HBA1C MFR BLD: 5.4 % (ref 0–5.6)
HCO3 SERPL-SCNC: 25 MMOL/L (ref 22–29)
HCT VFR BLD AUTO: 49.7 % (ref 40–53)
HGB BLD-MCNC: 16.7 G/DL (ref 13.3–17.7)
IMM GRANULOCYTES # BLD: 0 10E3/UL
IMM GRANULOCYTES NFR BLD: 0 %
LYMPHOCYTES # BLD AUTO: 2.3 10E3/UL (ref 0.8–5.3)
LYMPHOCYTES NFR BLD AUTO: 30 %
MCH RBC QN AUTO: 29.5 PG (ref 26.5–33)
MCHC RBC AUTO-ENTMCNC: 33.6 G/DL (ref 31.5–36.5)
MCV RBC AUTO: 88 FL (ref 78–100)
MONOCYTES # BLD AUTO: 0.6 10E3/UL (ref 0–1.3)
MONOCYTES NFR BLD AUTO: 8 %
NEUTROPHILS # BLD AUTO: 4.4 10E3/UL (ref 1.6–8.3)
NEUTROPHILS NFR BLD AUTO: 57 %
PLATELET # BLD AUTO: 221 10E3/UL (ref 150–450)
POTASSIUM SERPL-SCNC: 5 MMOL/L (ref 3.4–5.3)
PROT SERPL-MCNC: 7.9 G/DL (ref 6.4–8.3)
RBC # BLD AUTO: 5.66 10E6/UL (ref 4.4–5.9)
SODIUM SERPL-SCNC: 139 MMOL/L (ref 135–145)
T4 FREE SERPL-MCNC: 1.04 NG/DL (ref 0.9–1.7)
TSH SERPL DL<=0.005 MIU/L-ACNC: 5.19 UIU/ML (ref 0.3–4.2)
WBC # BLD AUTO: 7.7 10E3/UL (ref 4–11)

## 2024-11-05 PROCEDURE — 91320 SARSCV2 VAC 30MCG TRS-SUC IM: CPT

## 2024-11-05 PROCEDURE — 90656 IIV3 VACC NO PRSV 0.5 ML IM: CPT

## 2024-11-05 PROCEDURE — 84439 ASSAY OF FREE THYROXINE: CPT

## 2024-11-05 PROCEDURE — 84443 ASSAY THYROID STIM HORMONE: CPT

## 2024-11-05 PROCEDURE — 90471 IMMUNIZATION ADMIN: CPT

## 2024-11-05 PROCEDURE — 85025 COMPLETE CBC W/AUTO DIFF WBC: CPT

## 2024-11-05 PROCEDURE — 90480 ADMN SARSCOV2 VAC 1/ONLY CMP: CPT

## 2024-11-05 PROCEDURE — 93000 ELECTROCARDIOGRAM COMPLETE: CPT

## 2024-11-05 PROCEDURE — 99214 OFFICE O/P EST MOD 30 MIN: CPT | Mod: 25

## 2024-11-05 PROCEDURE — 80053 COMPREHEN METABOLIC PANEL: CPT

## 2024-11-05 PROCEDURE — 36415 COLL VENOUS BLD VENIPUNCTURE: CPT

## 2024-11-05 PROCEDURE — 83036 HEMOGLOBIN GLYCOSYLATED A1C: CPT

## 2024-11-05 SDOH — HEALTH STABILITY: PHYSICAL HEALTH: ON AVERAGE, HOW MANY DAYS PER WEEK DO YOU ENGAGE IN MODERATE TO STRENUOUS EXERCISE (LIKE A BRISK WALK)?: 3 DAYS

## 2024-11-05 ASSESSMENT — PAIN SCALES - GENERAL: PAINLEVEL_OUTOF10: NO PAIN (0)

## 2024-11-05 ASSESSMENT — SOCIAL DETERMINANTS OF HEALTH (SDOH): HOW OFTEN DO YOU GET TOGETHER WITH FRIENDS OR RELATIVES?: ONCE A WEEK

## 2024-11-05 NOTE — PROGRESS NOTES
Preventive Care Visit  [facility]  EDITH Murray CNP, Nurse Practitioner Primary Care  Nov 5, 2024  {Provider  Link to [facility] :407211}    {PROVIDER CHARTING PREFERENCE:825337}    Sonny Goodwin is a 34 year old, presenting for the following:  No chief complaint on file.        11/5/2024     7:22 AM   Additional Questions   Roomed by alesha   Accompanied by self         11/5/2024     7:22 AM   Patient Reported Additional Medications   Patient reports taking the following new medications no          HPI  ***  {MA/LPN/RN Pre-Provider Visit Orders- hCG/UA/Strep (Optional):528003}  {SUPERLIST (Optional):772070}  {additonal problems for provider to add (Optional):438086}  Health Care Directive  Patient does not have a Health Care Directive: {ADVANCE_DIRECTIVE_STATUS:090712}      11/5/2024   General Health   How would you rate your overall physical health? Good   Feel stress (tense, anxious, or unable to sleep) Only a little      (!) STRESS CONCERN      11/5/2024   Nutrition   Three or more servings of calcium each day? Yes   Diet: Regular (no restrictions)   How many servings of fruit and vegetables per day? (!) 0-1   How many sweetened beverages each day? (!) 2            11/5/2024   Exercise   Days per week of moderate/strenous exercise 3 days            11/5/2024   Social Factors   Frequency of gathering with friends or relatives Once a week   Worry food won't last until get money to buy more No   Food not last or not have enough money for food? No   Do you have housing? (Housing is defined as stable permanent housing and does not include staying ouside in a car, in a tent, in an abandoned building, in an overnight shelter, or couch-surfing.) Yes   Are you worried about losing your housing? No   Lack of transportation? No   Unable to get utilities (heat,electricity)? No            11/5/2024   Dental   Dentist two times every year? Yes            11/5/2024   TB Screening   Were  "you born outside of the [place]? No            Today's PHQ-2 Score:       11/5/2024     7:14 AM   PHQ-2 ( 1999 [employer])   Q1: Little interest or pleasure in doing things 0    Q2: Feeling down, depressed or hopeless 0    PHQ-2 Score 0    Q1: Little interest or pleasure in doing things Not at all   Q2: Feeling down, depressed or hopeless Not at all   PHQ-2 Score 0       Patient-reported           11/5/2024   Substance Use   Alcohol more than 3/day or more than 7/wk No   Do you use any other substances recreationally? No        Social History     Tobacco Use    Smoking status: Never    Smokeless tobacco: Never   Vaping Use    Vaping status: Never Used   Substance Use Topics    Alcohol use: Yes     Comment: rarely    Drug use: Never     {Provider  If there are gaps in the social history shown above, please follow the link to update and then refresh the note Link to Social and Substance History :253353}      11/5/2024   STI Screening   New sexual partner(s) since last STI/HIV test? No            11/5/2024   Contraception/Family Planning   Questions about contraception or family planning No        {Provider  REQUIRED FOR AWV Use the storyboard to review patient history, after sections have been marked as reviewed, refresh note to capture documentation:228246}   Reviewed and updated as needed this visit by Provider                    {HISTORY OPTIONS (Optional):071752}    {ROS Picklists (Optional):850100}     Objective    Exam  BP (!) 143/94 (BP Location: Left arm, Patient Position: Sitting, Cuff Size: Adult Large)   Pulse 68   Temp 97.7  F (36.5  C) (Temporal)   Resp 17   Ht 1.863 m (6' 1.35\")   Wt 97.9 kg (215 lb 12.8 oz)   SpO2 96%   BMI 28.20 kg/m     Estimated body mass index is 28.2 kg/m  as calculated from the following:    Height as of this encounter: 1.863 m (6' 1.35\").    Weight as of this encounter: 97.9 kg (215 lb 12.8 oz).    Physical Exam  {Exam Choices (Optional):317692}        Signed Electronically by: " Quang Ornelas, APRN CNP  {Email feedback regarding this note to [email or web address]   :376915}

## 2024-11-05 NOTE — PROGRESS NOTES
"  Assessment & Plan     Lightheadedness  - reviewed ddx of patient's sxs  - vitally stable, NAD, neurologic exam / cardiac unremarkable normal today  - joint decision making to check labs  - TSH with free T4 reflex  - CBC with platelets and differential  - Comprehensive metabolic panel (BMP + Alb, Alk Phos, ALT, AST, Total. Bili, TP)  - Hemoglobin A1c  - EKG reviewed w/ pt today, no ST/T-wave abnormalities consistent w/ ischemia, no arrhythmia, no block   - discussed strict ER precautions and included additional information in AVS  - follow up in 1-2 weeks w/ persistent sxs, prn sooner  - PRIMARY CARE FOLLOW-UP SCHEDULING    Impacted cerumen of right ear  - CT REMOVAL IMPACTED CERUMEN IRRIGATION/LVG UNILAT    Need for influenza vaccination  - INFLUENZA VACCINE, SPLIT VIRUS, TRIVALENT,PF (FLUZONE\FLUARIX)    Need for COVID-19 vaccine  - COVID-19 12+ (PFIZER)    BMI  Estimated body mass index is 28.2 kg/m  as calculated from the following:    Height as of this encounter: 1.863 m (6' 1.35\").    Weight as of this encounter: 97.9 kg (215 lb 12.8 oz).     The patient verbalized understanding and agreement with the plan today and has no additional questions or concerns at this time.    Sonny Goodwin is a 34 year old, presenting for the following health issues:  Dizziness        11/5/2024     7:22 AM   Additional Questions   Roomed by alesha   Accompanied by self         11/5/2024     7:22 AM   Patient Reported Additional Medications   Patient reports taking the following new medications no     HPI     Lightheadedness - reports symptoms started 2-3 weeks ago. Symptoms are daily but come and go. Had a few episodes where he felt he was going to pass out. Severe episodes are occurring 2 times per day, but does feel baseline lightheadedness or \"off\". Denies room spinning sensation. Reports symptoms are more like near-syncope. No syncopal episodes. No fevers or chills. Not worse with head movements. Not worse with exertion. " "Will be doing routine exercises and then episodes will start though. Denies alcohol, tobacco, or drug use. 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 No family history of sudden cardiac death or cardiac disease. No chest pressure / dyspnea, orthopnea, palpitations, or edema. No headaches, vision changes, weakness/numbness/tingling. No gait instability. No facial drooping. Was taking creatinine supplements, but stopped these about 1 week ago. Does think symptoms have gotten a bit better. Also stopped drinking energy drinks, was drinking one per day. Does not feel episodes are related to anxiety, position changes, or food intake.     Review of Systems  Constitutional, neuro, ENT, endocrine, pulmonary, cardiac, gastrointestinal, genitourinary, musculoskeletal, integument and psychiatric systems are negative, except as otherwise noted.      Objective    /83 (BP Location: Left arm, Patient Position: Sitting, Cuff Size: Adult Large)   Pulse 68   Temp 97.7  F (36.5  C) (Temporal)   Resp 17   Ht 1.863 m (6' 1.35\")   Wt 97.9 kg (215 lb 12.8 oz)   SpO2 96%   BMI 28.20 kg/m    Body mass index is 28.2 kg/m .    Orthostatic Vitals   11/05/24 0838  143/90, P 68 (standing)  11/05/24 0837 134/91, P 68 (sitting)   11/05/24 0836  132/86, P 70 (lying)    Physical Exam     General:  alert, oriented, pleasant and conversant. NAD. Non-toxic  HEENT:  normocephalic, atraumatic. Sclera white, conjunctiva clear, EOMs intact. PERRL. No Nystagmus. Right canal +cerumen impaction. Left canal clear, TM grey, cone of light and bony landmark visualized. Nares patent. OP w/o erythema, edema or lesions.  Normal dentition  Neck:  supple, FROM; no thyromegaly, lymphadenopathy, or masses   Chest: chest expansion symmetrical bilaterally, lung sounds clear without wheezes, rhonchi or rales  CV: S1, S2, RRR without murmurs, rubs or gallops. No edema. No carotid bruits.   MSK: steady gait, FROM  Derm: no rashes, lesions, " or ulcers. No erythema, induration or nodules  Neuro: CNII-XII grossly intact, clear and logical thought and speech. Strength 5/5 of UEs and LEs. Normal finger to nose test. Negative straight arm raise test. Negative Romberg.   Psych:  cooperative, calm mood and congruent affect    Signed Electronically by: EDITH Murray CNP

## 2024-11-06 ENCOUNTER — ORDERS ONLY (AUTO-RELEASED) (OUTPATIENT)
Dept: FAMILY MEDICINE | Facility: CLINIC | Age: 34
End: 2024-11-06
Payer: COMMERCIAL

## 2024-11-06 ENCOUNTER — TELEPHONE (OUTPATIENT)
Dept: FAMILY MEDICINE | Facility: CLINIC | Age: 34
End: 2024-11-06
Payer: COMMERCIAL

## 2024-11-06 DIAGNOSIS — R42 LIGHTHEADEDNESS: Primary | ICD-10-CM

## 2024-11-06 DIAGNOSIS — R79.89 ELEVATED LFTS: ICD-10-CM

## 2024-11-06 DIAGNOSIS — K76.0 FATTY LIVER DISEASE, NONALCOHOLIC: Primary | ICD-10-CM

## 2024-11-06 DIAGNOSIS — R42 LIGHTHEADEDNESS: ICD-10-CM

## 2024-11-06 NOTE — TELEPHONE ENCOUNTER
----- Message from Quang Ornelas sent at 11/6/2024  9:25 AM CST -----  Notified patient via mychart.   EDITH Murray CNP

## 2024-11-06 NOTE — TELEPHONE ENCOUNTER
Consulted w/ Dr Box about patient's sxs.     Nursing, will you please notify the patient -   I additionally placed an order for Zio, this will be mailed to patient. Please let him know we will do a Zio for a week to further look at the heart with his episodes of lightheadedness.     If episodes persist or worsen, I recommend close follow up as we discussed. ER precautions were discussed during our visit as well.     Thanks,  EDITH Murray CNP

## 2024-11-06 NOTE — TELEPHONE ENCOUNTER
This writer attempted to contact patient on 11/06/24      Reason for call message and left message.      If patient calls back:   Registered Nurse called.     Anastasiia Gilmore, NORMANN, RN  [facility]

## 2024-11-07 NOTE — TELEPHONE ENCOUNTER
Called and spoke to pt.    Relayed provider's note.    No questions at this time.    NORMAN MetzN, RN  [facility]

## 2024-11-19 LAB — CV ZIO PRELIM RESULTS: NORMAL

## 2024-11-20 ENCOUNTER — MYC MEDICAL ADVICE (OUTPATIENT)
Dept: FAMILY MEDICINE | Facility: CLINIC | Age: 34
End: 2024-11-20
Payer: COMMERCIAL

## 2024-12-01 ENCOUNTER — HEALTH MAINTENANCE LETTER (OUTPATIENT)
Age: 34
End: 2024-12-01

## 2025-01-07 ENCOUNTER — ANCILLARY PROCEDURE (OUTPATIENT)
Dept: ULTRASOUND IMAGING | Facility: CLINIC | Age: 35
End: 2025-01-07
Payer: COMMERCIAL

## 2025-01-07 DIAGNOSIS — K76.0 FATTY LIVER DISEASE, NONALCOHOLIC: Primary | ICD-10-CM

## 2025-01-07 DIAGNOSIS — K76.0 FATTY LIVER DISEASE, NONALCOHOLIC: ICD-10-CM

## 2025-01-07 DIAGNOSIS — K74.60 CIRRHOSIS OF LIVER WITHOUT ASCITES, UNSPECIFIED HEPATIC CIRRHOSIS TYPE (H): ICD-10-CM

## 2025-01-07 DIAGNOSIS — R79.89 ELEVATED LFTS: ICD-10-CM

## 2025-01-07 PROCEDURE — 76705 ECHO EXAM OF ABDOMEN: CPT | Mod: TC | Performed by: RADIOLOGY

## 2025-01-21 NOTE — CONFIDENTIAL NOTE
RECORDS RECEIVED FROM: internal    Appt Date: 4.15.25    NOTES STATUS DETAILS   OFFICE NOTE from referring provider internal  Cande Coleman    OFFICE NOTES from other specialists internal  12.21.23 Cal[place]   11.17.22 Cabrera     MEDICATION LIST internal     IMAGING     ENDOSCOPY (IF AVAILABLE) internal  3.26.21   ULTRASOUND LIVER internal  1.7.25   LABS     BASIC METABOLIC PANEL internal  8.29.22   COMPLETE METABOLIC PANEL internal  11.5.24   COMPLETE BLOOD COUNT (CBC) internal  11.5.24

## 2025-02-03 DIAGNOSIS — K76.0 FATTY LIVER: ICD-10-CM

## 2025-02-03 DIAGNOSIS — K76.0 METABOLIC DYSFUNCTION-ASSOCIATED STEATOTIC LIVER DISEASE (MASLD): Primary | ICD-10-CM

## 2025-02-03 NOTE — PROGRESS NOTES
[facility] Hepatology    New Patient Visit    Referring provider:  Cande Coleman  Chief complaint:  Fatty Liver    Assessment  34 year old male past medical history of overweight status and fatty liver    #. Hepatic Steatosis  #. Abnormal liver studies  Patient has had abnormal alkaline phosphatase, AST and ALT since 2019.  He ended up getting his gallbladder removed around 2019 without any improvement in his liver studies.  Abdominal imaging demonstrates hepatic steatosis in the setting of being overweight plus a history of alcohol overuse for a period of time in late 2024.    Patient has a normal platelet count which argues against portal hypertension.  No recent imaging of the spleen to evaluate for splenomegaly.  Patient with a normal INR and albumin which suggest intact hepatic synthetic function. FIB-4 < 1.3 -this suggests against advanced fibrosis    Workup for other etiologies of liver disease has been generally negative.  ASIA is within normal limits which argues against autoimmune related liver disease.  Hepatitis B and C negative.  TSH within normal limits.  Iron studies without any evidence of iron overload    Patient has stopped drinking alcohol since January 2025.  He denies any supplements or medications.  He has been working on lifestyle and dietary changes to promote a healthy weight    Plan  -- Follow-up AMA, alpha-1 antitrypsin and celiac studies + HgA1c  -- Plan for random liver biopsy to evaluate for steatosis and fibrosis    Other/Health Maintenance:   -- Lifestyle and dietary changes with the goal of 7-10% weight loss   * Limit portion sizes and exclude MASLD promoting components: carbohydrates, sugars, processed food, foods & beverages high in added fructose   * Recommend aerobic exercise and resistance training at least three days a week with gradual increase over time  -- At this time plan for complete alcohol cessation  -- Continue to avoid any medications or supplements not  prescribed by a physician  -- Hepatitis A nonimmune: Plan for first dose of the hepatitis A vaccine today    RTC: PRN based on liver biopsy results    Mireya Schuster MD (Lizzie)  Advanced & Transplant Hepatology  [facility]    I spent 60 minutes on this encounter performing the following: reviewing the patient's medical record (clinic visits, hospital records, lab results, imaging and procedural documentation), history taking, physical exam and documentation on the date of the encounter. I also spent part of the time in coordination of care and counseling.    HPI:  Presented with his wife.    He first was diagnosed with fatty liver in 2020.  Around 2019 he had issues with his gallbladder and had a laparoscopic cholecystectomy.  He was noted to have abnormal liver studies at that time.    Starting in the fall 2024 he developed lightheadedness and vertigo.  He was taking energy drinks and creatine.  He also has 2 young children who are 1 and 2 years old -they are megha but have been stressors.  He underwent abdominal ultrasound that demonstrated changes concerning for cirrhosis and he became nervous.    January 2025 he started to make dramatic changes to his lifestyle and diet such that he is already lost 12 pounds.  He reports that his body fat has decreased by 5%.  His weight has gone from 215 pounds to 203 pounds.  When he graduated high school he was at 109 pounds.  He has not had any alcohol since January 2025.  Prior to that he was drinking 2-3 alcohol-containing beverages per day from November 2024 to January 2025.  Prior to that he was drinking 1-2 alcohol-containing beverages per week.    He reports doing generally well without any acute concerns or complaints.  He denies any signs or symptoms of decompensated liver disease such as jaundice, abdominal distention, lower extremity edema, lethargy, confusion, melena, hematemesis or hematochezia.    He denies taking any medications or  supplements not provided by a physician except for creatine in the fall and whey protein.    He denies any family history of liver disease.    Medical hx Surgical hx   Past Medical History:   Diagnosis Date    Fatty liver       Past Surgical History:   Procedure Laterality Date    ESOPHAGOSCOPY, GASTROSCOPY, DUODENOSCOPY (EGD), COMBINED  2017    hx Sen's Esophagus hx ulcers in [place]    ESOPHAGOSCOPY, GASTROSCOPY, DUODENOSCOPY (EGD), COMBINED  10/21/51579    Dr. Granados [facility]    ESOPHAGOSCOPY, GASTROSCOPY, DUODENOSCOPY (EGD), COMBINED N/A 10/21/2019    Procedure: ESOPHAGOGASTRODUODENOSCOPY, WITH BIOPSY with biopsies by cold forceps;  Surgeon: Regina Granados MD;  Location:  GI    ESOPHAGOSCOPY, GASTROSCOPY, DUODENOSCOPY (EGD), COMBINED N/A 3/26/2021    Procedure: ESOPHAGOGASTRODUODENOSCOPY, WITH BIOPSies using cold forceps;  Surgeon: Zen Irby MD;  Location:  GI    LAPAROSCOPIC CHOLECYSTECTOMY N/A 9/21/2019    Procedure: CHOLECYSTECTOMY, LAPAROSCOPIC;  Surgeon: Nicolas Chandra MD;  Location:  OR          Medications  Current Outpatient Medications   Medication Sig Dispense Refill    WHEY PROTEIN PO Take by mouth. 1 scoop after gym daily      montelukast (SINGULAIR) 10 MG tablet Take 1 tablet (10 mg) by mouth At Bedtime (Patient not taking: Reported on 12/21/2023) 30 tablet 11    salicylic acid (MEDIPLAST) 40 % miscellaneous Apply at bedtime for 4-6 weeks 30 each 1       Allergies  Allergies   Allergen Reactions    Food Itching     History of bananas, avocados    Seasonal Allergies      Spring and summer       Family hx Social hx   Family History   Problem Relation Age of Onset    No Known Problems Mother     Hypertension Father     Cerebrovascular Disease Maternal Grandmother     Colon Cancer No family hx of     Liver Disease No family hx of       Social History     Tobacco Use    Smoking status: Never    Smokeless tobacco: Never   Vaping Use    Vaping status: Never Used   Substance Use  "Topics    Alcohol use: Not Currently     Comment: has not had anything since liver dx in Jan 2025    Drug use: Never     - Employment: .  - Lives with wife and 2 kids  - Alcohol: None since January 2025.  Prior to that 1-3 alcohol-containing beverages a few times per week  - Tobacco: Denies  - Drugs: Denies     Review of systems  A 10-point review of systems was negative.    Examination  /89 (BP Location: Right arm, Patient Position: Sitting, Cuff Size: Adult Large)   Pulse 89   Temp 97.5  F (36.4  C) (Oral)   Resp 18   Ht 1.863 m (6' 1.35\")   Wt 93.4 kg (206 lb)   SpO2 98%   BMI 26.92 kg/m     Body mass index is 26.92 kg/m .    Gen-NAD  Eye-no conjunctival icterus  CVS- RRR, no murmurs  RS- CTA bilaterally  Abd-soft, nontender, non distended.  No hepatomegaly  Extr- 2+ radial pulses bilaterally, no lower extremity edema bilaterally  MS- hands without clubbing.  No palmar erythema  Skin- no jaundice  Psych- normal mood    Laboratory  BMP  Recent Labs   Lab Test 02/05/25  0834 11/05/24  0852 12/27/23  0727 08/29/22  0853    139 141 137   POTASSIUM 4.4 5.0 4.7 4.0   CHLORIDE 103 102 103 105   GULSHAN 9.9 9.8 9.7 9.4   CO2 26 25 29 27   BUN 14.7 13.3 14.8 13   CR 0.96 1.02 0.90 1.00   GLC 95 98 102* 110*     CBC  Recent Labs   Lab Test 02/05/25  0834 11/05/24  0852 12/27/23  0727 08/29/22  0853   WBC 7.8 7.7 9.1 7.7   RBC 5.55 5.66 5.34 5.37   HGB 16.3 16.7 15.7 15.6   HCT 50.1 49.7 46.0 47.1   MCV 90 88 86 88   MCH 29.4 29.5 29.4 29.1   MCHC 32.5 33.6 34.1 33.1   RDW 12.2 12.1 12.7 12.1    221 236 216     Liver Enzymes   Recent Labs   Lab Test 02/05/25  0834   PROTTOTAL 7.9   ALBUMIN 4.6   BILITOTAL 0.5   ALKPHOS 245*   AST 58*   *      INR   INR   Date Value Ref Range Status   02/05/2025 1.01 0.85 - 1.15 Final         Radiology  Abdominal ultrasound 1/7/2025  Diffuse hepatic steatosis. Nodular contour of the liver is present suggestive of cirrhosis. No focal " mass.    Abdominal ultrasound 9/21/2019  1.  Mildly dilated common bile duct measuring up to 8 mm. No  visualized choledocholithiasis seen.  2.  Scattered areas of focal fatty infiltration in the liver.  3.  Distended and sludge-filled gallbladder.

## 2025-02-05 ENCOUNTER — LAB (OUTPATIENT)
Dept: LAB | Facility: CLINIC | Age: 35
End: 2025-02-05
Payer: COMMERCIAL

## 2025-02-05 DIAGNOSIS — K76.0 FATTY LIVER: ICD-10-CM

## 2025-02-05 DIAGNOSIS — R94.5 ABNORMAL RESULTS OF LIVER FUNCTION STUDIES: ICD-10-CM

## 2025-02-05 DIAGNOSIS — K76.0 FATTY LIVER DISEASE, NONALCOHOLIC: ICD-10-CM

## 2025-02-05 LAB
AFP SERPL-MCNC: <1.8 NG/ML
ALBUMIN SERPL BCG-MCNC: 4.6 G/DL (ref 3.5–5.2)
ALP SERPL-CCNC: 245 U/L (ref 40–150)
ALT SERPL W P-5'-P-CCNC: 107 U/L (ref 0–70)
ANION GAP SERPL CALCULATED.3IONS-SCNC: 11 MMOL/L (ref 7–15)
AST SERPL W P-5'-P-CCNC: 58 U/L (ref 0–45)
BILIRUB DIRECT SERPL-MCNC: <0.2 MG/DL (ref 0–0.3)
BILIRUB SERPL-MCNC: 0.5 MG/DL
BUN SERPL-MCNC: 14.7 MG/DL (ref 6–20)
CALCIUM SERPL-MCNC: 9.9 MG/DL (ref 8.8–10.4)
CHLORIDE SERPL-SCNC: 103 MMOL/L (ref 98–107)
CREAT SERPL-MCNC: 0.96 MG/DL (ref 0.67–1.17)
EGFRCR SERPLBLD CKD-EPI 2021: >90 ML/MIN/1.73M2
ERYTHROCYTE [DISTWIDTH] IN BLOOD BY AUTOMATED COUNT: 12.2 % (ref 10–15)
GLUCOSE SERPL-MCNC: 95 MG/DL (ref 70–99)
HAV AB SER QL IA: NONREACTIVE
HBV CORE AB SERPL QL IA: NONREACTIVE
HBV SURFACE AB SERPL IA-ACNC: 507 M[IU]/ML
HBV SURFACE AB SERPL IA-ACNC: REACTIVE M[IU]/ML
HBV SURFACE AG SERPL QL IA: NONREACTIVE
HCO3 SERPL-SCNC: 26 MMOL/L (ref 22–29)
HCT VFR BLD AUTO: 50.1 % (ref 40–53)
HCV AB SERPL QL IA: NONREACTIVE
HGB BLD-MCNC: 16.3 G/DL (ref 13.3–17.7)
INR PPP: 1.01 (ref 0.85–1.15)
IRON BINDING CAPACITY (ROCHE): 304 UG/DL (ref 240–430)
IRON SATN MFR SERPL: 33 % (ref 15–46)
IRON SERPL-MCNC: 100 UG/DL (ref 61–157)
MCH RBC QN AUTO: 29.4 PG (ref 26.5–33)
MCHC RBC AUTO-ENTMCNC: 32.5 G/DL (ref 31.5–36.5)
MCV RBC AUTO: 90 FL (ref 78–100)
PLATELET # BLD AUTO: 227 10E3/UL (ref 150–450)
POTASSIUM SERPL-SCNC: 4.4 MMOL/L (ref 3.4–5.3)
PROT SERPL-MCNC: 7.9 G/DL (ref 6.4–8.3)
RBC # BLD AUTO: 5.55 10E6/UL (ref 4.4–5.9)
SODIUM SERPL-SCNC: 140 MMOL/L (ref 135–145)
TSH SERPL DL<=0.005 MIU/L-ACNC: 3.86 UIU/ML (ref 0.3–4.2)
WBC # BLD AUTO: 7.8 10E3/UL (ref 4–11)

## 2025-02-05 PROCEDURE — 85027 COMPLETE CBC AUTOMATED: CPT

## 2025-02-05 PROCEDURE — 82105 ALPHA-FETOPROTEIN SERUM: CPT

## 2025-02-05 PROCEDURE — 86038 ANTINUCLEAR ANTIBODIES: CPT

## 2025-02-05 PROCEDURE — 82248 BILIRUBIN DIRECT: CPT

## 2025-02-05 PROCEDURE — 85610 PROTHROMBIN TIME: CPT

## 2025-02-05 PROCEDURE — 36415 COLL VENOUS BLD VENIPUNCTURE: CPT

## 2025-02-05 PROCEDURE — G0480 DRUG TEST DEF 1-7 CLASSES: HCPCS | Mod: 90

## 2025-02-05 PROCEDURE — 99000 SPECIMEN HANDLING OFFICE-LAB: CPT

## 2025-02-05 PROCEDURE — 80053 COMPREHEN METABOLIC PANEL: CPT

## 2025-02-05 PROCEDURE — 86803 HEPATITIS C AB TEST: CPT

## 2025-02-05 PROCEDURE — 87340 HEPATITIS B SURFACE AG IA: CPT

## 2025-02-05 PROCEDURE — 86704 HEP B CORE ANTIBODY TOTAL: CPT

## 2025-02-05 PROCEDURE — 83550 IRON BINDING TEST: CPT

## 2025-02-05 PROCEDURE — 86706 HEP B SURFACE ANTIBODY: CPT

## 2025-02-05 PROCEDURE — 84443 ASSAY THYROID STIM HORMONE: CPT

## 2025-02-05 PROCEDURE — 83036 HEMOGLOBIN GLYCOSYLATED A1C: CPT

## 2025-02-05 PROCEDURE — 83540 ASSAY OF IRON: CPT

## 2025-02-05 PROCEDURE — 86708 HEPATITIS A ANTIBODY: CPT

## 2025-02-06 ENCOUNTER — OFFICE VISIT (OUTPATIENT)
Dept: GASTROENTEROLOGY | Facility: CLINIC | Age: 35
End: 2025-02-06
Attending: PHYSICIAN ASSISTANT
Payer: COMMERCIAL

## 2025-02-06 VITALS
TEMPERATURE: 97.5 F | HEIGHT: 73 IN | HEART RATE: 89 BPM | SYSTOLIC BLOOD PRESSURE: 130 MMHG | RESPIRATION RATE: 18 BRPM | DIASTOLIC BLOOD PRESSURE: 89 MMHG | WEIGHT: 206 LBS | OXYGEN SATURATION: 98 % | BODY MASS INDEX: 27.3 KG/M2

## 2025-02-06 DIAGNOSIS — R94.5 ABNORMAL RESULTS OF LIVER FUNCTION STUDIES: Primary | ICD-10-CM

## 2025-02-06 DIAGNOSIS — Z23 NEED FOR HEPATITIS A IMMUNIZATION: ICD-10-CM

## 2025-02-06 DIAGNOSIS — K74.60 CIRRHOSIS OF LIVER WITHOUT ASCITES, UNSPECIFIED HEPATIC CIRRHOSIS TYPE (H): ICD-10-CM

## 2025-02-06 DIAGNOSIS — K76.0 FATTY LIVER DISEASE, NONALCOHOLIC: ICD-10-CM

## 2025-02-06 LAB
ANA SER QL IF: NEGATIVE
EST. AVERAGE GLUCOSE BLD GHB EST-MCNC: 105 MG/DL
HBA1C MFR BLD: 5.3 % (ref 0–5.6)

## 2025-02-06 PROCEDURE — 250N000011 HC RX IP 250 OP 636: Performed by: INTERNAL MEDICINE

## 2025-02-06 PROCEDURE — 90632 HEPA VACCINE ADULT IM: CPT | Performed by: INTERNAL MEDICINE

## 2025-02-06 PROCEDURE — 90471 IMMUNIZATION ADMIN: CPT | Performed by: INTERNAL MEDICINE

## 2025-02-06 PROCEDURE — 99214 OFFICE O/P EST MOD 30 MIN: CPT | Performed by: INTERNAL MEDICINE

## 2025-02-06 RX ADMIN — HEPATITIS A VACCINE 1440 UNITS: 1440 INJECTION, SUSPENSION INTRAMUSCULAR at 13:52

## 2025-02-06 ASSESSMENT — PAIN SCALES - GENERAL: PAINLEVEL_OUTOF10: NO PAIN (0)

## 2025-02-06 NOTE — NURSING NOTE
"Chief Complaint   Patient presents with    Consult     Elevated LFT's and abnormal liver imaging      Vital signs:  Temp: 97.5  F (36.4  C) Temp src: Oral BP: 130/89 Pulse: 89   Resp: 18 SpO2: 98 %     Height: 186.3 cm (6' 1.35\") Weight: 93.4 kg (206 lb)  Estimated body mass index is 26.92 kg/m  as calculated from the following:    Height as of this encounter: 1.863 m (6' 1.35\").    Weight as of this encounter: 93.4 kg (206 lb).      Lula Becker, [facility]  2/6/2025 12:53 PM    "

## 2025-02-06 NOTE — LETTER
2/6/2025      Raudel Daniel  [address] [facility] [address]      Dear Colleague,    Thank you for referring your patient, Raudel Daniel, to the [facility] [place]. Please see a copy of my visit note below.    [facility] Hepatology    New Patient Visit    Referring provider:  Cande Coleman  Chief complaint:  Fatty Liver    Assessment  34 year old male past medical history of overweight status and fatty liver    #. Hepatic Steatosis  #. Abnormal liver studies  Patient has had abnormal alkaline phosphatase, AST and ALT since 2019.  He ended up getting his gallbladder removed around 2019 without any improvement in his liver studies.  Abdominal imaging demonstrates hepatic steatosis in the setting of being overweight plus a history of alcohol overuse for a period of time in late 2024.    Patient has a normal platelet count which argues against portal hypertension.  No recent imaging of the spleen to evaluate for splenomegaly.  Patient with a normal INR and albumin which suggest intact hepatic synthetic function. FIB-4 < 1.3 -this suggests against advanced fibrosis    Workup for other etiologies of liver disease has been generally negative.  ASIA is within normal limits which argues against autoimmune related liver disease.  Hepatitis B and C negative.  TSH within normal limits.  Iron studies without any evidence of iron overload    Patient has stopped drinking alcohol since January 2025.  He denies any supplements or medications.  He has been working on lifestyle and dietary changes to promote a healthy weight    Plan  -- Follow-up AMA, alpha-1 antitrypsin and celiac studies + HgA1c  -- Plan for random liver biopsy to evaluate for steatosis and fibrosis    Other/Health Maintenance:   -- Lifestyle and dietary changes with the goal of 7-10% weight loss   * Limit portion sizes and exclude MASLD promoting components: carbohydrates, sugars, processed food, foods &  beverages high in added fructose   * Recommend aerobic exercise and resistance training at least three days a week with gradual increase over time  -- At this time plan for complete alcohol cessation  -- Continue to avoid any medications or supplements not prescribed by a physician  -- Hepatitis A nonimmune: Plan for first dose of the hepatitis A vaccine today    RTC: PRN based on liver biopsy results    Mireya Schuster MD (Lizzie)  Advanced & Transplant Hepatology  [facility]    I spent 60 minutes on this encounter performing the following: reviewing the patient's medical record (clinic visits, hospital records, lab results, imaging and procedural documentation), history taking, physical exam and documentation on the date of the encounter. I also spent part of the time in coordination of care and counseling.    HPI:  Presented with his wife.    He first was diagnosed with fatty liver in 2020.  Around 2019 he had issues with his gallbladder and had a laparoscopic cholecystectomy.  He was noted to have abnormal liver studies at that time.    Starting in the fall 2024 he developed lightheadedness and vertigo.  He was taking energy drinks and creatine.  He also has 2 young children who are 1 and 2 years old -they are megha but have been stressors.  He underwent abdominal ultrasound that demonstrated changes concerning for cirrhosis and he became nervous.    January 2025 he started to make dramatic changes to his lifestyle and diet such that he is already lost 12 pounds.  He reports that his body fat has decreased by 5%.  His weight has gone from 215 pounds to 203 pounds.  When he graduated high school he was at 109 pounds.  He has not had any alcohol since January 2025.  Prior to that he was drinking 2-3 alcohol-containing beverages per day from November 2024 to January 2025.  Prior to that he was drinking 1-2 alcohol-containing beverages per week.    He reports doing generally well without  any acute concerns or complaints.  He denies any signs or symptoms of decompensated liver disease such as jaundice, abdominal distention, lower extremity edema, lethargy, confusion, melena, hematemesis or hematochezia.    He denies taking any medications or supplements not provided by a physician except for creatine in the fall and whey protein.    He denies any family history of liver disease.    Medical hx Surgical hx   Past Medical History:   Diagnosis Date     Fatty liver       Past Surgical History:   Procedure Laterality Date     ESOPHAGOSCOPY, GASTROSCOPY, DUODENOSCOPY (EGD), COMBINED  2017    hx Sen's Esophagus hx ulcers in [place]     ESOPHAGOSCOPY, GASTROSCOPY, DUODENOSCOPY (EGD), COMBINED  10/21/50019    Dr. Granados [facility]     ESOPHAGOSCOPY, GASTROSCOPY, DUODENOSCOPY (EGD), COMBINED N/A 10/21/2019    Procedure: ESOPHAGOGASTRODUODENOSCOPY, WITH BIOPSY with biopsies by cold forceps;  Surgeon: Regina Granados MD;  Location:  GI     ESOPHAGOSCOPY, GASTROSCOPY, DUODENOSCOPY (EGD), COMBINED N/A 3/26/2021    Procedure: ESOPHAGOGASTRODUODENOSCOPY, WITH BIOPSies using cold forceps;  Surgeon: Zen Irby MD;  Location:  GI     LAPAROSCOPIC CHOLECYSTECTOMY N/A 9/21/2019    Procedure: CHOLECYSTECTOMY, LAPAROSCOPIC;  Surgeon: Nicolas Chandra MD;  Location:  OR          Medications  Current Outpatient Medications   Medication Sig Dispense Refill     WHEY PROTEIN PO Take by mouth. 1 scoop after gym daily       montelukast (SINGULAIR) 10 MG tablet Take 1 tablet (10 mg) by mouth At Bedtime (Patient not taking: Reported on 12/21/2023) 30 tablet 11     salicylic acid (MEDIPLAST) 40 % miscellaneous Apply at bedtime for 4-6 weeks 30 each 1       Allergies  Allergies   Allergen Reactions     Food Itching     History of bananas, avocados     Seasonal Allergies      Spring and summer       Family hx Social hx   Family History   Problem Relation Age of Onset     No Known Problems Mother      Hypertension  "Father      Cerebrovascular Disease Maternal Grandmother      Colon Cancer No family hx of      Liver Disease No family hx of       Social History     Tobacco Use     Smoking status: Never     Smokeless tobacco: Never   Vaping Use     Vaping status: Never Used   Substance Use Topics     Alcohol use: Not Currently     Comment: has not had anything since liver dx in Jan 2025     Drug use: Never     - Employment: .  - Lives with wife and 2 kids  - Alcohol: None since January 2025.  Prior to that 1-3 alcohol-containing beverages a few times per week  - Tobacco: Denies  - Drugs: Denies     Review of systems  A 10-point review of systems was negative.    Examination  /89 (BP Location: Right arm, Patient Position: Sitting, Cuff Size: Adult Large)   Pulse 89   Temp 97.5  F (36.4  C) (Oral)   Resp 18   Ht 1.863 m (6' 1.35\")   Wt 93.4 kg (206 lb)   SpO2 98%   BMI 26.92 kg/m     Body mass index is 26.92 kg/m .    Gen-NAD  Eye-no conjunctival icterus  CVS- RRR, no murmurs  RS- CTA bilaterally  Abd-soft, nontender, non distended.  No hepatomegaly  Extr- 2+ radial pulses bilaterally, no lower extremity edema bilaterally  MS- hands without clubbing.  No palmar erythema  Skin- no jaundice  Psych- normal mood    Laboratory  BMP  Recent Labs   Lab Test 02/05/25  0834 11/05/24  0852 12/27/23  0727 08/29/22  0853    139 141 137   POTASSIUM 4.4 5.0 4.7 4.0   CHLORIDE 103 102 103 105   GULSHAN 9.9 9.8 9.7 9.4   CO2 26 25 29 27   BUN 14.7 13.3 14.8 13   CR 0.96 1.02 0.90 1.00   GLC 95 98 102* 110*     CBC  Recent Labs   Lab Test 02/05/25  0834 11/05/24  0852 12/27/23  0727 08/29/22  0853   WBC 7.8 7.7 9.1 7.7   RBC 5.55 5.66 5.34 5.37   HGB 16.3 16.7 15.7 15.6   HCT 50.1 49.7 46.0 47.1   MCV 90 88 86 88   MCH 29.4 29.5 29.4 29.1   MCHC 32.5 33.6 34.1 33.1   RDW 12.2 12.1 12.7 12.1    221 236 216     Liver Enzymes   Recent Labs   Lab Test 02/05/25  0834   PROTTOTAL 7.9   ALBUMIN 4.6   BILITOTAL 0.5 "   ALKPHOS 245*   AST 58*   *      INR   INR   Date Value Ref Range Status   02/05/2025 1.01 0.85 - 1.15 Final         Radiology  Abdominal ultrasound 1/7/2025  Diffuse hepatic steatosis. Nodular contour of the liver is present suggestive of cirrhosis. No focal mass.    Abdominal ultrasound 9/21/2019  1.  Mildly dilated common bile duct measuring up to 8 mm. No  visualized choledocholithiasis seen.  2.  Scattered areas of focal fatty infiltration in the liver.  3.  Distended and sludge-filled gallbladder.           Again, thank you for allowing me to participate in the care of your patient.        Sincerely,        Mireya Schuster MD    Electronically signed

## 2025-03-04 ENCOUNTER — HOSPITAL ENCOUNTER (OUTPATIENT)
Facility: AMBULATORY SURGERY CENTER | Age: 35
Discharge: HOME | End: 2025-03-04
Attending: RADIOLOGY | Admitting: PHYSICIAN ASSISTANT
Payer: COMMERCIAL

## 2025-03-04 ENCOUNTER — ANCILLARY PROCEDURE (OUTPATIENT)
Dept: RADIOLOGY | Facility: AMBULATORY SURGERY CENTER | Age: 35
End: 2025-03-04
Attending: INTERNAL MEDICINE
Payer: COMMERCIAL

## 2025-03-04 VITALS
WEIGHT: 200 LBS | BODY MASS INDEX: 25.67 KG/M2 | TEMPERATURE: 97.5 F | HEART RATE: 65 BPM | DIASTOLIC BLOOD PRESSURE: 72 MMHG | SYSTOLIC BLOOD PRESSURE: 115 MMHG | RESPIRATION RATE: 16 BRPM | OXYGEN SATURATION: 96 % | HEIGHT: 74 IN

## 2025-03-04 DIAGNOSIS — K76.0 FATTY LIVER DISEASE, NONALCOHOLIC: ICD-10-CM

## 2025-03-04 PROCEDURE — 99152 MOD SED SAME PHYS/QHP 5/>YRS: CPT | Performed by: PHYSICIAN ASSISTANT

## 2025-03-04 PROCEDURE — 88313 SPECIAL STAINS GROUP 2: CPT | Mod: 26 | Performed by: PATHOLOGY

## 2025-03-04 PROCEDURE — 47000 NEEDLE BIOPSY OF LIVER PERQ: CPT | Performed by: PHYSICIAN ASSISTANT

## 2025-03-04 PROCEDURE — 76942 ECHO GUIDE FOR BIOPSY: CPT | Mod: 26 | Performed by: PHYSICIAN ASSISTANT

## 2025-03-04 PROCEDURE — 47000 NEEDLE BIOPSY OF LIVER PERQ: CPT

## 2025-03-04 PROCEDURE — 88307 TISSUE EXAM BY PATHOLOGIST: CPT | Mod: 26 | Performed by: PATHOLOGY

## 2025-03-04 PROCEDURE — 88307 TISSUE EXAM BY PATHOLOGIST: CPT | Mod: TC | Performed by: PHYSICIAN ASSISTANT

## 2025-03-04 RX ORDER — NALOXONE HYDROCHLORIDE 0.4 MG/ML
0.2 INJECTION, SOLUTION INTRAMUSCULAR; INTRAVENOUS; SUBCUTANEOUS
Status: DISCONTINUED | OUTPATIENT
Start: 2025-03-04 | End: 2025-03-05 | Stop reason: HOSPADM

## 2025-03-04 RX ORDER — FENTANYL CITRATE 50 UG/ML
INJECTION, SOLUTION INTRAMUSCULAR; INTRAVENOUS PRN
Status: DISCONTINUED | OUTPATIENT
Start: 2025-03-04 | End: 2025-03-04 | Stop reason: HOSPADM

## 2025-03-04 RX ORDER — LIDOCAINE 40 MG/G
CREAM TOPICAL
Status: DISCONTINUED | OUTPATIENT
Start: 2025-03-04 | End: 2025-03-05 | Stop reason: HOSPADM

## 2025-03-04 RX ORDER — NALOXONE HYDROCHLORIDE 0.4 MG/ML
0.4 INJECTION, SOLUTION INTRAMUSCULAR; INTRAVENOUS; SUBCUTANEOUS
Status: DISCONTINUED | OUTPATIENT
Start: 2025-03-04 | End: 2025-03-05 | Stop reason: HOSPADM

## 2025-03-04 RX ORDER — ONDANSETRON 2 MG/ML
4 INJECTION INTRAMUSCULAR; INTRAVENOUS EVERY 6 HOURS PRN
Status: DISCONTINUED | OUTPATIENT
Start: 2025-03-04 | End: 2025-03-05 | Stop reason: HOSPADM

## 2025-03-04 RX ORDER — FLUMAZENIL 0.1 MG/ML
0.2 INJECTION, SOLUTION INTRAVENOUS
Status: DISCONTINUED | OUTPATIENT
Start: 2025-03-04 | End: 2025-03-05 | Stop reason: HOSPADM

## 2025-03-04 RX ORDER — FENTANYL CITRATE 50 UG/ML
25-50 INJECTION, SOLUTION INTRAMUSCULAR; INTRAVENOUS EVERY 5 MIN PRN
Status: DISCONTINUED | OUTPATIENT
Start: 2025-03-04 | End: 2025-03-05 | Stop reason: HOSPADM

## 2025-03-04 NOTE — CONSULTS
[facility] Procedure Note        Sedation:      Performed by: Heather Campo PA-C  Authorized by: Heather Campo PA-C    Pre-Procedure Assessment done at 0800.    Expected Level:  Moderate Sedation    Indication:  Sedation is required to allow for  native liver biopsy    Consent obtained from patient after discussing the risks, benefits and alternatives.    PO Intake:  Appropriately NPO for procedure    ASA Class:  Class 1 - HEALTHY PATIENT    Mallampati:  Grade 1:  Soft palate, uvula, tonsillar pillars, and posterior pharyngeal wall visible    Lungs: Lungs Clear with good breath sounds bilaterally.     Heart: Normal heart sounds and rate    Focused history and physical completed prior to procedure. I have reviewed the lab findings, diagnostic data, medications, and the plan for sedation. I have determined this patient to be an appropriate candidate for the planned sedation and procedure and have reassessed the patient IMMEDIATELY PRIOR to sedation and procedure.

## 2025-03-04 NOTE — DISCHARGE INSTRUCTIONS
A collaboration between [facility] and [facility]  Experts in minimally invasive, targeted treatments performed using imaging guidance    Liver Biopsy    Today you had a needle remove a piece of tissue from your liver.    After you go home:  Keep any applied tape/gauze dressings clean and dry.  Change tape/gauze dressings if they get wet or soiled.  You may remove the dressing 48 hours after the procedure.  Don't shower until 48 hours after the procedure.  Do not perform strenuous activities or lift greater than 10 pounds for the next three days.  If there is bleeding or oozing from the procedure site, apply firm pressure to the area for 5-10 minutes.  If the bleeding continues seek medical advice at the numbers below.    For 24 hours after any sedation used:  Relax and take it easy.  No strenuous activities.  Do not drive or operate machines at home or at work.  No alcohol consumption.  Do not make any important or legal decisions.    Call our Interventional Radiology (IR) service if:  If you start bleeding from the procedure site.  If you do start to bleed from the site, lie down and hold some pressure on the site.  Our radiology provider can help you decide if you need to return to the hospital.  If you feel dizzy or lightheaded.  If you suddenly feel short of breath.  If you have persistent pain at biopsy site.  If the skin around the biopsy site is swollen, reddened, painful, or has any discharge.  If you feel nauseated and  just not right .  If you have a fever of greater than 100.5  F and chills in the first 5 days after procedure.  Any other concerns related to your procedure.    [facility]  Interventional Radiology (IR)  [address] [place] [address]    Contact Number:  [phone]  (IR control desk)  Monday - Friday 8:00 am - 4:30 pm    After hours for urgent concerns:   [phone]  After 4:30 pm Monday - Friday, Weekends and Holidays.   Ask for Interventional Radiology on-call.  Someone is available 24 hours a day.  [facility] toll free number:  [phone]

## 2025-03-04 NOTE — BRIEF OP NOTE
[facility]    Brief Operative Note    Pre-operative diagnosis: NAFL (nonalcoholic fatty liver) [K76.0]  Post-operative diagnosis Same as pre-operative diagnosis    Procedure: Percutaneous biopsy liver, N/A - Abdomen    Surgeon: Surgeons and Role:     * Lionel Mleo MD - Primary     * Heather Campo PA-C - Assisting  Anesthesia: Moderate Sedation   Estimated Blood Loss: Minimal    Drains: None  Specimens:   ID Type Source Tests Collected by Time Destination   1 : Liver Biopsy Biopsy Other SURGICAL PATHOLOGY EXAM Heather Campo PA-C 3/4/2025  9:03 AM      Findings:   Image guided native liver biopsy. Three passes, three cores .  Complications: None.  Implants: * No implants in log *

## 2025-03-06 DIAGNOSIS — R94.5 ABNORMAL RESULTS OF LIVER FUNCTION STUDIES: Primary | ICD-10-CM

## 2025-03-06 LAB
PATH REPORT.COMMENTS IMP SPEC: NORMAL
PATH REPORT.FINAL DX SPEC: NORMAL
PATH REPORT.GROSS SPEC: NORMAL
PATH REPORT.MICROSCOPIC SPEC OTHER STN: NORMAL
PATH REPORT.RELEVANT HX SPEC: NORMAL
PHOTO IMAGE: NORMAL

## 2025-03-28 ENCOUNTER — ANCILLARY PROCEDURE (OUTPATIENT)
Dept: MRI IMAGING | Facility: CLINIC | Age: 35
End: 2025-03-28
Attending: INTERNAL MEDICINE
Payer: COMMERCIAL

## 2025-03-28 DIAGNOSIS — R94.5 ABNORMAL RESULTS OF LIVER FUNCTION STUDIES: ICD-10-CM

## 2025-03-28 PROCEDURE — A9585 GADOBUTROL INJECTION: HCPCS | Performed by: RADIOLOGY

## 2025-03-28 PROCEDURE — 74183 MRI ABD W/O CNTR FLWD CNTR: CPT | Performed by: RADIOLOGY

## 2025-03-28 RX ORDER — GADOBUTROL 604.72 MG/ML
9 INJECTION INTRAVENOUS ONCE
Status: COMPLETED | OUTPATIENT
Start: 2025-03-28 | End: 2025-03-28

## 2025-03-28 RX ADMIN — GADOBUTROL 9 ML: 604.72 INJECTION INTRAVENOUS at 08:35

## 2025-03-31 ENCOUNTER — PREP FOR PROCEDURE (OUTPATIENT)
Dept: GASTROENTEROLOGY | Facility: CLINIC | Age: 35
End: 2025-03-31
Payer: COMMERCIAL

## 2025-03-31 ENCOUNTER — TELEPHONE (OUTPATIENT)
Dept: GASTROENTEROLOGY | Facility: CLINIC | Age: 35
End: 2025-03-31
Payer: COMMERCIAL

## 2025-03-31 DIAGNOSIS — Z01.818 PREOP GENERAL PHYSICAL EXAM: Primary | ICD-10-CM

## 2025-03-31 DIAGNOSIS — R93.3 ABNORMAL FINDING ON GI TRACT IMAGING: Primary | ICD-10-CM

## 2025-03-31 DIAGNOSIS — K83.01 PSC (PRIMARY SCLEROSING CHOLANGITIS) (H): Primary | ICD-10-CM

## 2025-03-31 NOTE — TELEPHONE ENCOUNTER
Per Dr. Mitchell    Procedure/Imaging/Clinic: Endoscopic Retrograde Cholangiopancreatography (ERCP)  Physician: Stephen   Timing: Next available   Scope time needed:20 min   Fluoro/C-arm needed: Yes   Anesthesia:General   Dx: PSC, biliary dilation   Tier:Tier 2 - Not life/limb threatening but potential for  patient morbidity/mortality or adverse  patient/disease outcome if  surgery/procedure not done within 30 day   Location: [facility]   OK to schedule while attending: Yes   Header of letter for pt communication: ERCP with biopsies and possible dilation of bile ducts   Preop: PAC    Called patient to discuss.

## 2025-03-31 NOTE — PROGRESS NOTES
Called patient regarding diagnosis of PSC.    Discussed the patient's case with Dr. Mitchell -plan for ERCP given -there are segmental strictures in the distal/mid main bile and left intrahepatics that would benefit from dilation.    Plan for colonoscopy for evaluation of ulcerative colitis    Plan for FibroScan.    See patient back within 3 months

## 2025-03-31 NOTE — TELEPHONE ENCOUNTER
Advanced Endoscopy     Referring provider: Dr Schuster    Referred to: [facility]     Provider Requested: Dr. Mitchell     Referral Received: 03/31/25     Records received: Epic     Images received: PACS    Insurance Coverage: [employer]    Evaluation for: biliary ductal changes    Clinical History (per RN review):     REferring provider clinic note 2/6/25  Expand All Collapse All  [facility] Hepatology     New Patient Visit     Referring provider:                    Cande Coleman  Chief complaint:                        Fatty Liver     Assessment  34 year old male past medical history of overweight status and fatty liver     #. Hepatic Steatosis  #. Abnormal liver studies  Patient has had abnormal alkaline phosphatase, AST and ALT since 2019.  He ended up getting his gallbladder removed around 2019 without any improvement in his liver studies.  Abdominal imaging demonstrates hepatic steatosis in the setting of being overweight plus a history of alcohol overuse for a period of time in late 2024.         Liver Biopsy 3/4/25 suggestive of obstructive process  Final Diagnosis   A. LIVER, NEEDLE BIOPSY:  - Portal tract changes concerning for large duct stricture (see comment)   Electronically signed by Kraig Villarreal MD on 3/6/2025 at 12:11 PM   Comment  [id number]   The sample size is adequate for evaluation and shows mild patchy portal inflammation, predominantly lymphocytic with rare lymphoid aggregates.  Some of the larger portal tracts show fibrosis around bile ducts.  There appears to be mild focal portal edema. The PAS and PAS-D stains show no abnormal alpha-1-antitrypsin profile or other cytoplasmic accumulations. Iron stain is negative for stainable parenchymal or Kupffer iron. The trichrome and reticulin stains show mild fibrosis fibrosis (stage I-2/4).     Overall, the biopsy shows portal tract changes that are concerning for large duct obstruction.  MRCP is recommended to rule  out primary or secondary sclerosing cholangitis.         Narrative & Impression   MRCP Without and With Contrast     CLINICAL HISTORY: Liver biopsy with bile duct changes - concern for  sclerosing cholangitis; Abnormal results of liver function studies     DATE: 3/28/2025 8:47 AM     TECHNIQUE:      Images were acquired with and without intravenous gadolinium contrast  through the upper abdomen. The following MR images were acquired  without intravenous contrast: TrueFISP, multiplanar T2-weighted, axial  T1 in/out of phase, T2-weighted MRCP images, axial diffusion-weighted  and axial apparent diffusion coefficient. T1-weighted images were  obtained before contrast at the multiple time points following  contrast injection. 3-D reformatted images were generated by the  technologist. Contrast dose: 9mL Gadavist     Comparison study: Ultrasound 1/7/2025, CT 9/21/2019     FINDINGS:     Liver: Atrophic changes of the lateral left hepatic lobe and segment 6  without overt morphologic features of cirrhosis. No evidence of  significant iron or fat deposition. No focal hepatic mass.     Biliary tree:  Mild to moderate intra and extrahepatic biliary  dilatation with central right and left hepatic ducts measuring up to 7  mm in diameter, and common bile duct measuring up to 10 mm in  diameter. Irregular beaded appearance of the biliary tree with  multiple segmental strictures and normal caliber intervening segments,  present diffusely throughout the intra and extra hepatic biliary tree.  No dominant high-grade stricture of the central biliary tree.  Associated periductal increased T2 signal and enhancement throughout  both the right and left hepatic lobes, most conspicuous in segments 5,  6 and 7. No masslike enhancement.     Gallbladder: Surgically absent.     Spleen: Not enlarged.     Kidneys: No hydronephrosis. No renal mass.      Adrenal glands: Within normal limits.     Pancreas: Preservation of intrinsic T1 hyperintense  parenchymal  signal. No pancreatic ductal dilation. No pancreatic mass.      Bowel: The visualized small and large bowel is nondistended.     Lymph nodes: Several prominent tai hepatic lymph nodes, likely  reactive. No suspicious lymphadenopathy.     Blood vessels: The major abdominal arteries and portal vessels are  patent.     Lung bases: Grossly clear.     Bones and soft tissues: No suspicious osseous lesion.     Mesentery and abdominal wall: Within normal limits.     Ascites: None.                                                                      IMPRESSION:   1. Mild-moderate biliary ductal dilatation and diffuse irregular  beaded appearance of the biliary tree with multiple segmental  strictures, compatible with primary sclerosing cholangitis. No  dominant high-grade stricture.  2. Periductal edema and enhancement suggestive of active inflammation.  No focal masslike enhancement.       Provider review date: 03/31/25    Provider Decision for clinic consultation/Orders:            Referral updates/Patient contacted: .

## 2025-04-01 ENCOUNTER — TELEPHONE (OUTPATIENT)
Dept: GASTROENTEROLOGY | Facility: CLINIC | Age: 35
End: 2025-04-01
Payer: COMMERCIAL

## 2025-04-01 NOTE — TELEPHONE ENCOUNTER
Writer contacted patient to schedule     Patient scheduled on 5/23.     Following details were discussed:  Will need a , someone to stay with them for 24 hours and should stay in town for 24 hours (within 45 min of Hospital) post procedure  Needs to get pre-op physical completed. If outside  health system will need physical faxed to number [phone]   Will be receiving phone call from [facility] nurses to discuss arrival times etc...       Pre-operative procedure scheduled with who/where? Per [facility]- PAC referral placed, informed patient that the PAC number will be included in the letter I send in [employer]  Communicated that arrival times can be anytime from 530am to 4pm: Yes  Any arrival time requests:  Wants an arrival time around 9-10am

## 2025-04-01 NOTE — TELEPHONE ENCOUNTER
----- Message from Amalia APPIAH sent at 3/31/2025  4:31 PM CDT -----  [facility]    I know that Stephen's schedule is a bit delicate at the , so I talked to patient, and put orders in but didn't discusss a date. Can you give him a call to finalize? We can get him in where able.    We did talk about a PAC visit for preop and orders are placed for that    Thanks!    Procedure/Imaging/Clinic: Endoscopic Retrograde Cholangiopancreatography (ERCP)  Physician: Stephen   Timing: Next available   Scope time needed:20 min   Fluoro/C-arm needed: Yes   Anesthesia:General   Dx: PSC, biliary dilation   Tier:Tier 2 - Not life/limb threatening but potential for  patient morbidity/mortality or adverse  patient/disease outcome if  surgery/procedure not done within 30 day   Location: [facility]   OK to schedule while attending: Yes   Header of letter for pt communication: ERCP with biopsies and possible dilation of bile ducts   Preop: PAC

## 2025-04-02 ENCOUNTER — TELEPHONE (OUTPATIENT)
Dept: GASTROENTEROLOGY | Facility: CLINIC | Age: 35
End: 2025-04-02
Payer: COMMERCIAL

## 2025-04-02 NOTE — TELEPHONE ENCOUNTER
Patient confirmed scheduled appointment:     Date: 7/16/25  Time: 11:00 am  Appointment Type: Return Liver  Provider: Dr. Mireya Schuster  Location: [place]  Testing/imaging: Labs  Additional Notes:

## 2025-04-08 NOTE — CONFIDENTIAL NOTE
FUTURE VISIT INFORMATION      SURGERY INFORMATION:  Date:  5.2.25   Location: [facility]  Surgeon: Man Mitchell MD   Anesthesia Type:    Procedure: ENDOSCOPIC RETROGRADE CHOLANGIOPANCREATOGRAPHY   Consult:     RECORDS REQUESTED FROM:       Primary Care Provider:  TRUDY Patton Celia Stebbins     Pertinent Medical History:  -3.26.21 [place]     Most recent EKG+ Tracing:  -with readings- 11.5.24

## 2025-04-15 ENCOUNTER — PRE VISIT (OUTPATIENT)
Dept: GASTROENTEROLOGY | Facility: CLINIC | Age: 35
End: 2025-04-15
Payer: COMMERCIAL

## 2025-05-02 ENCOUNTER — PRE VISIT (OUTPATIENT)
Dept: SURGERY | Facility: CLINIC | Age: 35
End: 2025-05-02

## 2025-05-23 ENCOUNTER — APPOINTMENT (OUTPATIENT)
Dept: GENERAL RADIOLOGY | Facility: CLINIC | Age: 35
End: 2025-05-23
Attending: INTERNAL MEDICINE
Payer: COMMERCIAL

## 2025-05-23 ENCOUNTER — HOSPITAL ENCOUNTER (OUTPATIENT)
Facility: CLINIC | Age: 35
Discharge: HOME | End: 2025-05-23
Attending: INTERNAL MEDICINE | Admitting: INTERNAL MEDICINE
Payer: COMMERCIAL

## 2025-05-23 VITALS
SYSTOLIC BLOOD PRESSURE: 143 MMHG | TEMPERATURE: 98.1 F | BODY MASS INDEX: 26.23 KG/M2 | HEART RATE: 65 BPM | HEIGHT: 74 IN | WEIGHT: 204.37 LBS | RESPIRATION RATE: 12 BRPM | DIASTOLIC BLOOD PRESSURE: 90 MMHG | OXYGEN SATURATION: 100 %

## 2025-05-23 DIAGNOSIS — K83.01 PSC (PRIMARY SCLEROSING CHOLANGITIS) (H): Primary | ICD-10-CM

## 2025-05-23 LAB
ALBUMIN SERPL BCG-MCNC: 4.3 G/DL (ref 3.5–5.2)
ALP SERPL-CCNC: 187 U/L (ref 40–150)
ALT SERPL W P-5'-P-CCNC: 94 U/L (ref 0–70)
ANION GAP SERPL CALCULATED.3IONS-SCNC: 8 MMOL/L (ref 7–15)
AST SERPL W P-5'-P-CCNC: 45 U/L (ref 0–45)
BILIRUB SERPL-MCNC: 0.5 MG/DL
BUN SERPL-MCNC: 13.9 MG/DL (ref 6–20)
CALCIUM SERPL-MCNC: 9.3 MG/DL (ref 8.8–10.4)
CHLORIDE SERPL-SCNC: 104 MMOL/L (ref 98–107)
CREAT SERPL-MCNC: 0.9 MG/DL (ref 0.67–1.17)
EGFRCR SERPLBLD CKD-EPI 2021: >90 ML/MIN/1.73M2
ERCP: NORMAL
ERYTHROCYTE [DISTWIDTH] IN BLOOD BY AUTOMATED COUNT: 12.5 % (ref 10–15)
GLUCOSE SERPL-MCNC: 91 MG/DL (ref 70–99)
HCO3 SERPL-SCNC: 27 MMOL/L (ref 22–29)
HCT VFR BLD AUTO: 46.3 % (ref 40–53)
HGB BLD-MCNC: 15.5 G/DL (ref 13.3–17.7)
INR PPP: 0.99 (ref 0.85–1.15)
LAB ORDER RESULT STATUS: NORMAL
LIPASE SERPL-CCNC: 29 U/L (ref 13–60)
Lab: NORMAL
MCH RBC QN AUTO: 28.8 PG (ref 26.5–33)
MCHC RBC AUTO-ENTMCNC: 33.5 G/DL (ref 31.5–36.5)
MCV RBC AUTO: 86 FL (ref 78–100)
PERFORMING LABORATORY: NORMAL
PLATELET # BLD AUTO: 212 10E3/UL (ref 150–450)
POTASSIUM SERPL-SCNC: 4.4 MMOL/L (ref 3.4–5.3)
PROT SERPL-MCNC: 7.5 G/DL (ref 6.4–8.3)
PROTHROMBIN TIME: 13.1 SECONDS (ref 11.8–14.8)
RBC # BLD AUTO: 5.39 10E6/UL (ref 4.4–5.9)
SODIUM SERPL-SCNC: 139 MMOL/L (ref 135–145)
TEST NAME: NORMAL
WBC # BLD AUTO: 8.7 10E3/UL (ref 4–11)

## 2025-05-23 PROCEDURE — 88112 CYTOPATH CELL ENHANCE TECH: CPT | Mod: TC | Performed by: INTERNAL MEDICINE

## 2025-05-23 PROCEDURE — 370N000017 HC ANESTHESIA TECHNICAL FEE, PER MIN: Performed by: INTERNAL MEDICINE

## 2025-05-23 PROCEDURE — 999N000181 XR SURGERY CARM FLUORO GREATER THAN 5 MIN W STILLS

## 2025-05-23 PROCEDURE — 250N000009 HC RX 250: Performed by: INTERNAL MEDICINE

## 2025-05-23 PROCEDURE — 36415 COLL VENOUS BLD VENIPUNCTURE: CPT | Performed by: INTERNAL MEDICINE

## 2025-05-23 PROCEDURE — C1726 CATH, BAL DIL, NON-VASCULAR: HCPCS | Performed by: INTERNAL MEDICINE

## 2025-05-23 PROCEDURE — 360N000075 HC SURGERY LEVEL 2, PER MIN: Performed by: INTERNAL MEDICINE

## 2025-05-23 PROCEDURE — 84132 ASSAY OF SERUM POTASSIUM: CPT | Performed by: INTERNAL MEDICINE

## 2025-05-23 PROCEDURE — 255N000002 HC RX 255 OP 636: Performed by: INTERNAL MEDICINE

## 2025-05-23 PROCEDURE — 710N000012 HC RECOVERY PHASE 2, PER MINUTE: Performed by: INTERNAL MEDICINE

## 2025-05-23 PROCEDURE — 85041 AUTOMATED RBC COUNT: CPT | Performed by: INTERNAL MEDICINE

## 2025-05-23 PROCEDURE — 83690 ASSAY OF LIPASE: CPT | Performed by: INTERNAL MEDICINE

## 2025-05-23 PROCEDURE — C1769 GUIDE WIRE: HCPCS | Performed by: INTERNAL MEDICINE

## 2025-05-23 PROCEDURE — 85610 PROTHROMBIN TIME: CPT | Performed by: INTERNAL MEDICINE

## 2025-05-23 PROCEDURE — 272N000001 HC OR GENERAL SUPPLY STERILE: Performed by: INTERNAL MEDICINE

## 2025-05-23 PROCEDURE — 999N000141 HC STATISTIC PRE-PROCEDURE NURSING ASSESSMENT: Performed by: INTERNAL MEDICINE

## 2025-05-23 PROCEDURE — 88112 CYTOPATH CELL ENHANCE TECH: CPT | Mod: 26 | Performed by: PATHOLOGY

## 2025-05-23 PROCEDURE — C1877 STENT, NON-COAT/COV W/O DEL: HCPCS | Performed by: INTERNAL MEDICINE

## 2025-05-23 PROCEDURE — 88305 TISSUE EXAM BY PATHOLOGIST: CPT | Mod: 26 | Performed by: PATHOLOGY

## 2025-05-23 PROCEDURE — 710N000010 HC RECOVERY PHASE 1, LEVEL 2, PER MIN: Performed by: INTERNAL MEDICINE

## 2025-05-23 PROCEDURE — 88366 INSITU HYBRIDIZATION (FISH): CPT | Performed by: INTERNAL MEDICINE

## 2025-05-23 DEVICE — A STERILE NON-BIOABSORBABLE TUBULAR DEVICE INTENDED TO BE IMPLANTED IN AN OBSTRUCTED PANCREATIC DUCT (E.G., DUE TO STRICTURE OR MALIGNANCY) TO MAINTAIN LUMINAL PATENCY; IT MAY ALSO BE INTENDED TO TREAT A WIDE VARIETY OF CONDITIONS IN PANCREATIC ENDOSCOPY (E.G., DRAIN PSEUDOCYST, TREAT FISTULA OR DUCT DISRUPTION). IT IS MADE ENTIRELY OF A SYNTHETIC POLYMER AND HAS A CONTINUOUS TUBE DESIGN WITH OR WITHOUT RETENTION FLANGES. THIS IS A SINGLE-USE DEVICE.
Type: IMPLANTABLE DEVICE | Site: BILE DUCT | Status: FUNCTIONAL
Brand: FREEMAN PANCREATIC FLEXI-STENT

## 2025-05-23 RX ORDER — FENTANYL CITRATE 50 UG/ML
50 INJECTION, SOLUTION INTRAMUSCULAR; INTRAVENOUS EVERY 5 MIN PRN
Status: DISCONTINUED | OUTPATIENT
Start: 2025-05-23 | End: 2025-05-23 | Stop reason: HOSPADM

## 2025-05-23 RX ORDER — DEXAMETHASONE SODIUM PHOSPHATE 4 MG/ML
4 INJECTION, SOLUTION INTRA-ARTICULAR; INTRALESIONAL; INTRAMUSCULAR; INTRAVENOUS; SOFT TISSUE
Status: CANCELLED | OUTPATIENT
Start: 2025-05-23

## 2025-05-23 RX ORDER — OXYCODONE HYDROCHLORIDE 5 MG/1
5 TABLET ORAL
Refills: 0 | Status: CANCELLED | OUTPATIENT
Start: 2025-05-23

## 2025-05-23 RX ORDER — LIDOCAINE 40 MG/G
CREAM TOPICAL
Status: DISCONTINUED | OUTPATIENT
Start: 2025-05-23 | End: 2025-05-23 | Stop reason: HOSPADM

## 2025-05-23 RX ORDER — ONDANSETRON 4 MG/1
4 TABLET, ORALLY DISINTEGRATING ORAL EVERY 30 MIN PRN
Status: CANCELLED | OUTPATIENT
Start: 2025-05-23

## 2025-05-23 RX ORDER — INDOMETHACIN 50 MG/1
SUPPOSITORY RECTAL PRN
Status: DISCONTINUED | OUTPATIENT
Start: 2025-05-23 | End: 2025-05-23 | Stop reason: HOSPADM

## 2025-05-23 RX ORDER — ONDANSETRON 2 MG/ML
4 INJECTION INTRAMUSCULAR; INTRAVENOUS EVERY 30 MIN PRN
Status: DISCONTINUED | OUTPATIENT
Start: 2025-05-23 | End: 2025-05-23 | Stop reason: HOSPADM

## 2025-05-23 RX ORDER — ACETAMINOPHEN 325 MG/1
975 TABLET ORAL ONCE
Status: CANCELLED | OUTPATIENT
Start: 2025-05-23 | End: 2025-05-23

## 2025-05-23 RX ORDER — FENTANYL CITRATE 50 UG/ML
25 INJECTION, SOLUTION INTRAMUSCULAR; INTRAVENOUS EVERY 5 MIN PRN
Status: DISCONTINUED | OUTPATIENT
Start: 2025-05-23 | End: 2025-05-23 | Stop reason: HOSPADM

## 2025-05-23 RX ORDER — ONDANSETRON 4 MG/1
4 TABLET, ORALLY DISINTEGRATING ORAL EVERY 30 MIN PRN
Status: DISCONTINUED | OUTPATIENT
Start: 2025-05-23 | End: 2025-05-23 | Stop reason: HOSPADM

## 2025-05-23 RX ORDER — DEXAMETHASONE SODIUM PHOSPHATE 4 MG/ML
4 INJECTION, SOLUTION INTRA-ARTICULAR; INTRALESIONAL; INTRAMUSCULAR; INTRAVENOUS; SOFT TISSUE
Status: DISCONTINUED | OUTPATIENT
Start: 2025-05-23 | End: 2025-05-23 | Stop reason: HOSPADM

## 2025-05-23 RX ORDER — CIPROFLOXACIN 500 MG/1
500 TABLET, FILM COATED ORAL 2 TIMES DAILY
Qty: 10 TABLET | Refills: 0 | Status: SHIPPED | OUTPATIENT
Start: 2025-05-23 | End: 2025-05-28

## 2025-05-23 RX ORDER — HYDROMORPHONE HCL IN WATER/PF 6 MG/30 ML
0.2 PATIENT CONTROLLED ANALGESIA SYRINGE INTRAVENOUS EVERY 5 MIN PRN
Status: DISCONTINUED | OUTPATIENT
Start: 2025-05-23 | End: 2025-05-23 | Stop reason: HOSPADM

## 2025-05-23 RX ORDER — NALOXONE HYDROCHLORIDE 0.4 MG/ML
0.1 INJECTION, SOLUTION INTRAMUSCULAR; INTRAVENOUS; SUBCUTANEOUS
Status: CANCELLED | OUTPATIENT
Start: 2025-05-23

## 2025-05-23 RX ORDER — FENTANYL CITRATE 50 UG/ML
25 INJECTION, SOLUTION INTRAMUSCULAR; INTRAVENOUS
Refills: 0 | Status: CANCELLED | OUTPATIENT
Start: 2025-05-23

## 2025-05-23 RX ORDER — HYDROMORPHONE HCL IN WATER/PF 6 MG/30 ML
0.4 PATIENT CONTROLLED ANALGESIA SYRINGE INTRAVENOUS EVERY 5 MIN PRN
Status: DISCONTINUED | OUTPATIENT
Start: 2025-05-23 | End: 2025-05-23 | Stop reason: HOSPADM

## 2025-05-23 RX ORDER — OXYCODONE HYDROCHLORIDE 10 MG/1
10 TABLET ORAL
Refills: 0 | Status: CANCELLED | OUTPATIENT
Start: 2025-05-23

## 2025-05-23 RX ORDER — HALOPERIDOL 5 MG/ML
1 INJECTION INTRAMUSCULAR
Status: DISCONTINUED | OUTPATIENT
Start: 2025-05-23 | End: 2025-05-23 | Stop reason: HOSPADM

## 2025-05-23 RX ORDER — DIMENHYDRINATE 50 MG/ML
25 INJECTION, SOLUTION INTRAMUSCULAR; INTRAVENOUS
Status: DISCONTINUED | OUTPATIENT
Start: 2025-05-23 | End: 2025-05-23 | Stop reason: HOSPADM

## 2025-05-23 RX ORDER — SODIUM CHLORIDE, SODIUM LACTATE, POTASSIUM CHLORIDE, CALCIUM CHLORIDE 600; 310; 30; 20 MG/100ML; MG/100ML; MG/100ML; MG/100ML
INJECTION, SOLUTION INTRAVENOUS CONTINUOUS
Status: DISCONTINUED | OUTPATIENT
Start: 2025-05-23 | End: 2025-05-23 | Stop reason: HOSPADM

## 2025-05-23 RX ORDER — HYDROXYZINE HYDROCHLORIDE 25 MG/1
25 TABLET, FILM COATED ORAL EVERY 6 HOURS PRN
Status: DISCONTINUED | OUTPATIENT
Start: 2025-05-23 | End: 2025-05-23 | Stop reason: HOSPADM

## 2025-05-23 RX ORDER — ACETAMINOPHEN 325 MG/1
975 TABLET ORAL ONCE
Status: DISCONTINUED | OUTPATIENT
Start: 2025-05-23 | End: 2025-05-23 | Stop reason: HOSPADM

## 2025-05-23 RX ORDER — ONDANSETRON 2 MG/ML
4 INJECTION INTRAMUSCULAR; INTRAVENOUS EVERY 30 MIN PRN
Status: CANCELLED | OUTPATIENT
Start: 2025-05-23

## 2025-05-23 RX ORDER — IOPAMIDOL 510 MG/ML
INJECTION, SOLUTION INTRAVASCULAR PRN
Status: DISCONTINUED | OUTPATIENT
Start: 2025-05-23 | End: 2025-05-23 | Stop reason: HOSPADM

## 2025-05-23 RX ORDER — NALOXONE HYDROCHLORIDE 0.4 MG/ML
0.1 INJECTION, SOLUTION INTRAMUSCULAR; INTRAVENOUS; SUBCUTANEOUS
Status: DISCONTINUED | OUTPATIENT
Start: 2025-05-23 | End: 2025-05-23 | Stop reason: HOSPADM

## 2025-05-23 ASSESSMENT — ACTIVITIES OF DAILY LIVING (ADL)
ADLS_ACUITY_SCORE: 32

## 2025-05-23 NOTE — DISCHARGE INSTRUCTIONS
Contacting your Doctor -   To contact a doctor, call Dr. Mitchell office at [phone]    or:  [phone] and ask for the resident on call for Gastroenterology (answered 24 hours a day)   Emergency Department:  [facility]: [phone] 911 if you are in need of immediate or emergent help

## 2025-05-29 LAB
PATH REPORT.COMMENTS IMP SPEC: ABNORMAL
PATH REPORT.COMMENTS IMP SPEC: ABNORMAL
PATH REPORT.COMMENTS IMP SPEC: YES
PATH REPORT.FINAL DX SPEC: ABNORMAL
PATH REPORT.GROSS SPEC: ABNORMAL
PATH REPORT.MICROSCOPIC SPEC OTHER STN: ABNORMAL
PATH REPORT.RELEVANT HX SPEC: ABNORMAL

## 2025-05-30 ENCOUNTER — RESULTS FOLLOW-UP (OUTPATIENT)
Dept: GASTROENTEROLOGY | Facility: CLINIC | Age: 35
End: 2025-05-30

## 2025-06-03 LAB — MISCELLANEOUS TEST 1 (ARUP): NORMAL

## 2025-06-18 ENCOUNTER — PREP FOR PROCEDURE (OUTPATIENT)
Dept: GASTROENTEROLOGY | Facility: CLINIC | Age: 35
End: 2025-06-18
Payer: COMMERCIAL

## 2025-06-18 DIAGNOSIS — K83.01 PRIMARY SCLEROSING CHOLANGITIS (H): Primary | ICD-10-CM

## 2025-06-18 NOTE — PROGRESS NOTES
Please assist in scheduling:     Procedure/Imaging/Clinic: Endoscopic Retrograde Cholangiopancreatography (ERCP)   Physician: Stephen  Timing: 3 months  Scope time: Provider average   Anesthesia: General  Dx: Primary Sclerosing Cholangitis  Tier:3  Location: [facility]  OK to schedule while attending: Not specified by provider  Patient communication letter header:Endoscopic Retrograde Cholangiopancreatography (ERCP)

## 2025-06-24 ENCOUNTER — TELEPHONE (OUTPATIENT)
Dept: GASTROENTEROLOGY | Facility: CLINIC | Age: 35
End: 2025-06-24
Payer: COMMERCIAL

## 2025-06-24 NOTE — TELEPHONE ENCOUNTER
"Endoscopy Scheduling Screen    Caller: patient    Have you had any respiratory illness or flu-like symptoms in the last 10 days?  No    Patient is ACTIVE on [employer].  Inform patient that all appointment instructions will be sent via [employer].    Review patient's insurance for any non participating payor.      Ordering/Referring Provider:   ALIDA FARAH      (If ordering provider performs procedure, schedule with ordering provider unless otherwise instructed. )    BMI: Estimated body mass index is 26.24 kg/m  as calculated from the following:    Height as of 5/23/25: 1.88 m (6' 2\").    Weight as of 5/23/25: 92.7 kg (204 lb 5.9 oz).     Sedation Ordered  moderate sedation.   If patient BMI > 50 do not schedule in ASC.    If patient BMI > 45 do not schedule at [facility].    Are you taking methadone or Suboxone?  NO, No RN review required.    Have you been diagnosed and are being treated for severe PTSD or severe anxiety?  NO, No RN review required.    Are you taking any prescription medications for pain 3 or more times per week?   NO, No RN review required.    Do you have a history of malignant hyperthermia?  No    (Females) Are you currently pregnant?   No     Have you been diagnosed or told you have pulmonary hypertension?   No    Do you have an LVAD?  No    Have you been told you have moderate to severe sleep apnea?  No.    Have you been told you have COPD, asthma, or any other lung disease?  No    Has your doctor ordered any cardiac tests like echo, angiogram, stress test, ablation, or EKG, that you have not completed yet?  No    Do you  have a history of any heart conditions?  No     Have you ever had or are you waiting for an organ transplant?  No. Continue scheduling, no site restrictions.    Have you had a stroke or transient ischemic attack (TIA aka \"mini stroke\") in the last 2 years?   No.    Have you been diagnosed with or been told you have cirrhosis of the liver?   No.    Are you currently on " "dialysis?   No    Do you need assistance transferring?   No    BMI: Estimated body mass index is 26.24 kg/m  as calculated from the following:    Height as of 5/23/25: 1.88 m (6' 2\").    Weight as of 5/23/25: 92.7 kg (204 lb 5.9 oz).     Is patients BMI > 40 and scheduling location [facility]?  No    Do you take an injectable or oral medication for weight loss or diabetes (excluding insulin)?  No    Do you take the medication Naltrexone?  No    Do you take blood thinners?  No       Prep   Are you currently on dialysis or do you have chronic kidney disease?  No    Do you have a diagnosis of diabetes?  No    Do you have a diagnosis of cystic fibrosis (CF)?  No    On a regular basis do you go 3 -5 days between bowel movements?  No    BMI > 40?  No    Preferred Pharmacy:        [id number]  [place] - [address] [place] AT [facility]  [address]  [address] [place] [address]  Phone: [phone] Fax: [phone]          Final Scheduling Details     Procedure scheduled  Colonoscopy    Surgeon:  Mariely     Date of procedure:  8/28     Pre-OP / PAC:   No - Not required for this site.    Location  [facility] - [facility] - Patient preference.    Sedation   Moderate Sedation - Per order.      Patient Reminders:   You will receive a call from a Nurse to review instructions and health history.  This assessment must be completed prior to your procedure.  Failure to complete the Nurse assessment may result in the procedure being cancelled.      On the day of your procedure, please designate an adult(s) who can drive you home stay with you for the next 24 hours. The medicines used in the exam will make you sleepy. You will not be able to drive.      You cannot take public transportation, ride share services, or non-medical taxi service without a responsible caregiver.  Medical transport services are allowed with the requirement that a responsible caregiver will receive you at your destination.  We require that " drivers and caregivers are confirmed prior to your procedure.

## 2025-06-26 ENCOUNTER — ANCILLARY PROCEDURE (OUTPATIENT)
Dept: GENERAL RADIOLOGY | Facility: CLINIC | Age: 35
End: 2025-06-26
Attending: INTERNAL MEDICINE
Payer: COMMERCIAL

## 2025-06-26 DIAGNOSIS — Z46.89 ENCOUNTER FOR REMOVAL OF BILIARY STENT: ICD-10-CM

## 2025-07-01 DIAGNOSIS — K76.0 FATTY LIVER: Primary | ICD-10-CM

## 2025-07-14 DIAGNOSIS — R94.5 ABNORMAL RESULTS OF LIVER FUNCTION STUDIES: ICD-10-CM

## 2025-07-14 DIAGNOSIS — K83.01 PSC (PRIMARY SCLEROSING CHOLANGITIS) (H): Primary | ICD-10-CM

## 2025-07-14 NOTE — PROGRESS NOTES
[facility] Hepatology    Assessment  34 year old male past medical history of PSC, overweight status and fatty liver    #. PSC  #. Segmental biliary stricture in main bile duct  #. Left main duct stricture with upstream dilation  Patient has had abnormal alkaline phosphatase, AST and ALT since 2019.  He ended up getting his gallbladder removed around 2019 without any improvement in his liver studies.  Abdominal imaging demonstrates hepatic steatosis in the setting of being overweight plus a history of alcohol overuse for a period of time in late 2024.    Liver biopsy 2025 demonstrated portal changes consistent with duct obstruction. ERCP 5/2025 with evidence of biliary changes consistent with PSC + segmental biliary stricture in main bile duct + left main duct stricture with upstream dilation. Underwent sampling + FISH - sampling demonstrated evidence of dysplasia, but FISH negative.     Workup for other etiologies of liver disease has been generally negative.  ASIA is within normal limits which argues against autoimmune related liver disease.  Hepatitis B and C negative.  TSH within normal limits.  Iron studies without any evidence of iron overload    Plan for repeat ERCP +/- FISH and spyglass in August 2025. He is otherwise feeling quite well today    Plan  -- Follow up IgG4  -- Repeat ERCP w/spyglass due August 2025  -- If persistently elevated ALP, can consider: UDCA 13-23mg/kg/day  -- Annual MRI/MRCP + CA 19-9 alternating with US - US due 9/2025  -- Colonoscopy scheduled 8/2025; repeat pending results  -- Nutritional assessment: vitamin A, D and E yearly  -- Bone density: consider in 2-3 years     Other/Health Maintenance:   -- Avoid any medications or supplements not prescribed by a physician    RTC: 6 months    Mireya Schuster MD (Lizzie)  Advanced & Transplant Hepatology  [facility]    I spent 40 minutes on this encounter performing the following: reviewing the patient's  "medical record (clinic visits, hospital records, lab results, imaging and procedural documentation), history taking, physical exam and documentation on the date of the encounter. I also spent part of the time in coordination of care and counseling.    The longitudinal plan of care for the diagnosis(es)/condition(s) as documented were addressed during this visit. Due to the added complexity in care, I will continue to support Buddy in the subsequent management and with ongoing continuity of care.    HPI:  PSC  - Diagnosed: 2025    Patient presented alone.    He reports doing well without any acute concerns or complaints.    His energy level is good.  He feels so much better since his recent ERCP.  He has had 1-2 \"bad days\" where he developed abdominal discomfort that lasted for hours but then resolved.  Otherwise he denies any issues with fatigue or itching.  He denies any ongoing pain after meals.  He has no further issues with lightheaded spells.    He is eating well without any nausea or vomiting.  He has gained a little bit of weight.  He denies any fevers or chills.    He denies any signs or symptoms of decompensated liver disease.    He is exercising at the gym several times per week.  He uses whey protein.  He denies any other supplements or medications.    Medical hx Surgical hx   Past Medical History:   Diagnosis Date    Abnormal finding on GI tract imaging     Fatty liver     PONV (postoperative nausea and vomiting)     PSC (primary sclerosing cholangitis) (H)       Past Surgical History:   Procedure Laterality Date    ENDOSCOPIC RETROGRADE CHOLANGIOPANCREATOGRAM N/A 5/23/2025    Procedure: ENDOSCOPIC RETROGRADE CHOLANGIOPANCREATOGRAPHY WITH SPHINCTEROTOMY, BILIARY BIOPSY,  STRICTURE DILATION, SLUDGE REMOVAL AND STENT PLACEMENT;  Surgeon: Man Mitchell MD;  Location: [facility] OR    ESOPHAGOSCOPY, GASTROSCOPY, DUODENOSCOPY (EGD), COMBINED  2017    hx Sen's Esophagus hx ulcers in [place]    ESOPHAGOSCOPY, " GASTROSCOPY, DUODENOSCOPY (EGD), COMBINED  10/21/99461    Dr. [facility]    ESOPHAGOSCOPY, GASTROSCOPY, DUODENOSCOPY (EGD), COMBINED N/A 10/21/2019    Procedure: ESOPHAGOGASTRODUODENOSCOPY, WITH BIOPSY with biopsies by cold forceps;  Surgeon: Regina Granados MD;  Location: [facility]    ESOPHAGOSCOPY, GASTROSCOPY, DUODENOSCOPY (EGD), COMBINED N/A 3/26/2021    Procedure: ESOPHAGOGASTRODUODENOSCOPY, WITH BIOPSies using cold forceps;  Surgeon: Zen Irby MD;  Location: [facility]    IR LIVER BIOPSY PERCUTANEOUS  3/4/2025    LAPAROSCOPIC CHOLECYSTECTOMY N/A 9/21/2019    Procedure: CHOLECYSTECTOMY, LAPAROSCOPIC;  Surgeon: Nicolas Chandra MD;  Location: [facility] OR    PERCUTANEOUS BIOPSY LIVER N/A 3/4/2025    Procedure: Percutaneous biopsy liver;  Surgeon: Lionel Melo MD;  Location: [facility] OR          Medications  Current Outpatient Medications   Medication Sig Dispense Refill    WHEY PROTEIN PO Take by mouth. 1 scoop after gym daily         Allergies  Allergies   Allergen Reactions    Avocado Itching    Banana Itching    Food Itching     History of bananas, avocados    Seasonal Allergies      Spring and summer       Family hx Social hx   Family History   Problem Relation Age of Onset    No Known Problems Mother     Hypertension Father     Cerebrovascular Disease Maternal Grandmother     Colon Cancer No family hx of     Liver Disease No family hx of     Anesthesia Reaction No family hx of     Clotting Disorder No family hx of     Bleeding Disorder No family hx of       Social History     Tobacco Use    Smoking status: Never    Smokeless tobacco: Never   Vaping Use    Vaping status: Never Used   Substance Use Topics    Alcohol use: Not Currently     Comment: has not had anything since liver dx in Jan 2025    Drug use: Never     - Employment: .  - Lives with wife and 2 kids (Nael - older; Иван - younger)   - Alcohol: Rare  - Tobacco: Denies  - Drugs: Denies     Review of systems  A 10-point review of  "systems was negative.    Examination  /85   Pulse 63   Resp 16   Ht 1.88 m (6' 2\")   Wt 93.4 kg (206 lb)   SpO2 99%   BMI 26.45 kg/m     Body mass index is 26.45 kg/m .    Gen-NAD  Eye-no conjunctival icterus  CVS- RRR, no murmurs  RS- CTA bilaterally  Abd-soft, nontender, non distended.   Extr- 2+ radial pulses bilaterally, no lower extremity edema bilaterally  MS- hands without clubbing.  No palmar erythema  Skin- no jaundice  Psych- normal mood    Laboratory  BMP  Recent Labs   Lab Test 05/23/25  1119 02/05/25  0834 11/05/24  0852 12/27/23  0727    140 139 141   POTASSIUM 4.4 4.4 5.0 4.7   CHLORIDE 104 103 102 103   GULSHAN 9.3 9.9 9.8 9.7   CO2 27 26 25 29   BUN 13.9 14.7 13.3 14.8   CR 0.90 0.96 1.02 0.90   GLC 91 95 98 102*     CBC  Recent Labs   Lab Test 07/16/25  1034 05/23/25  1119 02/05/25  0834 11/05/24  0852   WBC 8.1 8.7 7.8 7.7   RBC 5.55 5.39 5.55 5.66   HGB 16.0 15.5 16.3 16.7   HCT 48.7 46.3 50.1 49.7   MCV 88 86 90 88   MCH 28.8 28.8 29.4 29.5   MCHC 32.9 33.5 32.5 33.6   RDW 12.5 12.5 12.2 12.1    212 227 221     Liver Function Studies -   Recent Labs   Lab Test 07/16/25  1034   PROTTOTAL 7.9   ALBUMIN 4.6   BILITOTAL 0.4   ALKPHOS 208*   AST 43   ALT 82*     INR   INR   Date Value Ref Range Status   07/16/2025 0.92 0.85 - 1.15 Final      Liver biopsy 3/4/2025  - Portal tract changes concerning for large duct stricture (see comment)     Radiology  Abdominal ultrasound 1/7/2025  Diffuse hepatic steatosis. Nodular contour of the liver is present suggestive of cirrhosis. No focal mass.    Fibroscan 4/2025  1. Estimated liver fibrosis is Stage 0-1   2. Steatosis Grade S0     Endoscopy  ERCP 5/23/2025   - Selective bilairy cannulation and uncomplicated                          biliary sphincterotomy.                          - Cholangiogram with a single short and segmental                          biliary stricture in the middle third of the main bile                          duct " with upstream mild main ductal dilation. Another                          stricture was seen in the left main duct with upstream                          left intrhaepatics dilation. Biliary tree otherwise                          with diffuse PSC pattern.                          - The biliary tree was swept and debris was found.                          - CBD as well as LIH strictures were successfully                          dilated to 4 mm.                          - Cells for cytology/FISH obtained from the middle                          third of the main bile duct.                          - One 5 Fr x 7 cm Dennison ''fall out'' plastic stent was                          placed into the common bile duct.     FISH: negative    COMMON BILE DUCT, COMMON BILE DUCT STRICTURE, BILE DUCT BRUSHING:  Interpretation -   - Biliary epithelium with moderate cytologic atypia, see comment  There are epithelial groups with moderate cytologic atypia characterized by anisonucleosis and prominent nucleoli. Overall the cytologic features are concerning for dysplasia; however, in the context of biliary stent, reactive atypia cannot be entirely excluded. Clinical correlation is recommended.

## 2025-07-16 ENCOUNTER — LAB (OUTPATIENT)
Dept: LAB | Facility: CLINIC | Age: 35
End: 2025-07-16
Attending: INTERNAL MEDICINE
Payer: COMMERCIAL

## 2025-07-16 ENCOUNTER — OFFICE VISIT (OUTPATIENT)
Dept: GASTROENTEROLOGY | Facility: CLINIC | Age: 35
End: 2025-07-16
Attending: INTERNAL MEDICINE
Payer: COMMERCIAL

## 2025-07-16 VITALS
SYSTOLIC BLOOD PRESSURE: 133 MMHG | HEART RATE: 63 BPM | HEIGHT: 74 IN | RESPIRATION RATE: 16 BRPM | DIASTOLIC BLOOD PRESSURE: 85 MMHG | OXYGEN SATURATION: 99 % | BODY MASS INDEX: 26.44 KG/M2 | WEIGHT: 206 LBS

## 2025-07-16 DIAGNOSIS — K83.01 PRIMARY SCLEROSING CHOLANGITIS (H): Primary | ICD-10-CM

## 2025-07-16 DIAGNOSIS — K83.01 PSC (PRIMARY SCLEROSING CHOLANGITIS) (H): ICD-10-CM

## 2025-07-16 DIAGNOSIS — K76.0 FATTY LIVER: ICD-10-CM

## 2025-07-16 DIAGNOSIS — K76.0 FATTY LIVER DISEASE, NONALCOHOLIC: ICD-10-CM

## 2025-07-16 DIAGNOSIS — R94.5 ABNORMAL RESULTS OF LIVER FUNCTION STUDIES: ICD-10-CM

## 2025-07-16 LAB
ALBUMIN SERPL BCG-MCNC: 4.6 G/DL (ref 3.5–5.2)
ALP SERPL-CCNC: 208 U/L (ref 40–150)
ALT SERPL W P-5'-P-CCNC: 82 U/L (ref 0–70)
ANION GAP SERPL CALCULATED.3IONS-SCNC: 10 MMOL/L (ref 7–15)
AST SERPL W P-5'-P-CCNC: 43 U/L (ref 0–45)
BILIRUB SERPL-MCNC: 0.4 MG/DL
BILIRUBIN DIRECT (ROCHE PRO & PURE): 0.19 MG/DL (ref 0–0.45)
BUN SERPL-MCNC: 17 MG/DL (ref 6–20)
CALCIUM SERPL-MCNC: 9.8 MG/DL (ref 8.8–10.4)
CHLORIDE SERPL-SCNC: 102 MMOL/L (ref 98–107)
CREAT SERPL-MCNC: 0.91 MG/DL (ref 0.67–1.17)
EGFRCR SERPLBLD CKD-EPI 2021: >90 ML/MIN/1.73M2
ERYTHROCYTE [DISTWIDTH] IN BLOOD BY AUTOMATED COUNT: 12.5 % (ref 10–15)
GLUCOSE SERPL-MCNC: 95 MG/DL (ref 70–99)
HCO3 SERPL-SCNC: 26 MMOL/L (ref 22–29)
HCT VFR BLD AUTO: 48.7 % (ref 40–53)
HGB BLD-MCNC: 16 G/DL (ref 13.3–17.7)
INR PPP: 0.92 (ref 0.85–1.15)
MCH RBC QN AUTO: 28.8 PG (ref 26.5–33)
MCHC RBC AUTO-ENTMCNC: 32.9 G/DL (ref 31.5–36.5)
MCV RBC AUTO: 88 FL (ref 78–100)
PLATELET # BLD AUTO: 237 10E3/UL (ref 150–450)
POTASSIUM SERPL-SCNC: 4.1 MMOL/L (ref 3.4–5.3)
PROT SERPL-MCNC: 7.9 G/DL (ref 6.4–8.3)
PROTHROMBIN TIME: 12.6 SECONDS (ref 11.8–14.8)
RBC # BLD AUTO: 5.55 10E6/UL (ref 4.4–5.9)
SODIUM SERPL-SCNC: 138 MMOL/L (ref 135–145)
WBC # BLD AUTO: 8.1 10E3/UL (ref 4–11)

## 2025-07-16 PROCEDURE — 82103 ALPHA-1-ANTITRYPSIN TOTAL: CPT | Mod: 90 | Performed by: PATHOLOGY

## 2025-07-16 PROCEDURE — 80053 COMPREHEN METABOLIC PANEL: CPT | Performed by: PATHOLOGY

## 2025-07-16 PROCEDURE — 99213 OFFICE O/P EST LOW 20 MIN: CPT | Performed by: INTERNAL MEDICINE

## 2025-07-16 PROCEDURE — 82248 BILIRUBIN DIRECT: CPT | Performed by: PATHOLOGY

## 2025-07-16 PROCEDURE — 36415 COLL VENOUS BLD VENIPUNCTURE: CPT | Performed by: PATHOLOGY

## 2025-07-16 PROCEDURE — 82787 IGG 1 2 3 OR 4 EACH: CPT | Performed by: INTERNAL MEDICINE

## 2025-07-16 PROCEDURE — 82784 ASSAY IGA/IGD/IGG/IGM EACH: CPT | Performed by: INTERNAL MEDICINE

## 2025-07-16 PROCEDURE — 85610 PROTHROMBIN TIME: CPT | Performed by: PATHOLOGY

## 2025-07-16 PROCEDURE — 85027 COMPLETE CBC AUTOMATED: CPT | Performed by: PATHOLOGY

## 2025-07-16 PROCEDURE — 99000 SPECIMEN HANDLING OFFICE-LAB: CPT | Performed by: PATHOLOGY

## 2025-07-16 PROCEDURE — 81332 SERPINA1 GENE: CPT | Mod: 90 | Performed by: PATHOLOGY

## 2025-07-16 ASSESSMENT — PAIN SCALES - GENERAL: PAINLEVEL_OUTOF10: NO PAIN (0)

## 2025-07-16 NOTE — LETTER
7/16/2025      Raudel Daniel  [id number] [facility] [address]      Dear Colleague,    Thank you for referring your patient, Raudel Daniel, to the [facility] [place]. Please see a copy of my visit note below.    [facility]    Assessment  34 year old male past medical history of PSC, overweight status and fatty liver    #. PSC  #. Segmental biliary stricture in main bile duct  #. Left main duct stricture with upstream dilation  Patient has had abnormal alkaline phosphatase, AST and ALT since 2019.  He ended up getting his gallbladder removed around 2019 without any improvement in his liver studies.  Abdominal imaging demonstrates hepatic steatosis in the setting of being overweight plus a history of alcohol overuse for a period of time in late 2024.    Liver biopsy 2025 demonstrated portal changes consistent with duct obstruction. ERCP 5/2025 with evidence of biliary changes consistent with PSC + segmental biliary stricture in main bile duct + left main duct stricture with upstream dilation. Underwent sampling + FISH - sampling demonstrated evidence of dysplasia, but FISH negative.     Workup for other etiologies of liver disease has been generally negative.  ASIA is within normal limits which argues against autoimmune related liver disease.  Hepatitis B and C negative.  TSH within normal limits.  Iron studies without any evidence of iron overload    Plan for repeat ERCP +/- FISH and spyglass in August 2025.  Pending repeat liver studies today -he is otherwise feeling quite well today    Plan  -- Follow up IgG4  -- Repeat ERCP w/spyglass due August 2025  -- If persistently elevated ALP, can consider: UDCA 13-23mg/kg/day  -- Annual MRI/MRCP + CA 19-9 alternating with US - US due 9/2025  -- Colonoscopy scheduled 8/2025; repeat pending results  -- Nutritional assessment: vitamin A, D and E yearly  -- Bone density: consider in 2-3 years     Other/Health  "Maintenance:   -- Avoid any medications or supplements not prescribed by a physician    RTC: 6 months    Mireya Schuster MD (Lizzie)  Advanced [facility] Transplant Hepatology  [facility]    I spent 40 minutes on this encounter performing the following: reviewing the patient's medical record (clinic visits, hospital records, lab results, imaging and procedural documentation), history taking, physical exam and documentation on the date of the encounter. I also spent part of the time in coordination of care and counseling.    The longitudinal plan of care for the diagnosis(es)/condition(s) as documented were addressed during this visit. Due to the added complexity in care, I will continue to support Buddy in the subsequent management and with ongoing continuity of care.    HPI:  PSC  - Diagnosed: 2025    Patient presented alone.    He reports doing well without any acute concerns or complaints.    His energy level is good.  He feels so much better since his recent ERCP.  He has had 1-2 \"bad days\" where he developed abdominal discomfort that lasted for hours but then resolved.  Otherwise he denies any issues with fatigue or itching.  He denies any ongoing pain after meals.  He has no further issues with lightheaded spells.    He is eating well without any nausea or vomiting.  He has gained a little bit of weight.  He denies any fevers or chills.    He denies any signs or symptoms of decompensated liver disease.    He is exercising at the gym several times per week.  He uses whey protein.  He denies any other supplements or medications.    Medical hx Surgical hx   Past Medical History:   Diagnosis Date     Abnormal finding on GI tract imaging      Fatty liver      PONV (postoperative nausea and vomiting)      PSC (primary sclerosing cholangitis) (H)       Past Surgical History:   Procedure Laterality Date     ENDOSCOPIC RETROGRADE CHOLANGIOPANCREATOGRAM N/A 5/23/2025    Procedure: ENDOSCOPIC RETROGRADE " CHOLANGIOPANCREATOGRAPHY WITH SPHINCTEROTOMY, BILIARY BIOPSY,  STRICTURE DILATION, SLUDGE REMOVAL AND STENT PLACEMENT;  Surgeon: Man Mitchell MD;  Location: [facility] OR     ESOPHAGOSCOPY, GASTROSCOPY, DUODENOSCOPY (EGD), COMBINED  2017    hx Sen's Esophagus hx ulcers in [place]     ESOPHAGOSCOPY, GASTROSCOPY, DUODENOSCOPY (EGD), COMBINED  10/21/26264    [facility]     ESOPHAGOSCOPY, GASTROSCOPY, DUODENOSCOPY (EGD), COMBINED N/A 10/21/2019    Procedure: ESOPHAGOGASTRODUODENOSCOPY, WITH BIOPSY with biopsies by cold forceps;  Surgeon: Regina Granados MD;  Location:  GI     ESOPHAGOSCOPY, GASTROSCOPY, DUODENOSCOPY (EGD), COMBINED N/A 3/26/2021    Procedure: ESOPHAGOGASTRODUODENOSCOPY, WITH BIOPSies using cold forceps;  Surgeon: Zen Irby MD;  Location:  GI     IR LIVER BIOPSY PERCUTANEOUS  3/4/2025     LAPAROSCOPIC CHOLECYSTECTOMY N/A 9/21/2019    Procedure: CHOLECYSTECTOMY, LAPAROSCOPIC;  Surgeon: Nicolas Chandra MD;  Location:  OR     PERCUTANEOUS BIOPSY LIVER N/A 3/4/2025    Procedure: Percutaneous biopsy liver;  Surgeon: Lionel Melo MD;  Location: [facility] OR          Medications  Current Outpatient Medications   Medication Sig Dispense Refill     WHEY PROTEIN PO Take by mouth. 1 scoop after gym daily         Allergies  Allergies   Allergen Reactions     Avocado Itching     Banana Itching     Food Itching     History of bananas, avocados     Seasonal Allergies      Spring and summer       Family hx Social hx   Family History   Problem Relation Age of Onset     No Known Problems Mother      Hypertension Father      Cerebrovascular Disease Maternal Grandmother      Colon Cancer No family hx of      Liver Disease No family hx of      Anesthesia Reaction No family hx of      Clotting Disorder No family hx of      Bleeding Disorder No family hx of       Social History     Tobacco Use     Smoking status: Never     Smokeless tobacco: Never   Vaping Use     Vaping status: Never Used  "  Substance Use Topics     Alcohol use: Not Currently     Comment: has not had anything since liver dx in Jan 2025     Drug use: Never     - Employment: .  - Lives with wife and 2 kids (Nael - older; Иван - younger)   - Alcohol: Rare  - Tobacco: Denies  - Drugs: Denies     Review of systems  A 10-point review of systems was negative.    Examination  /85   Pulse 63   Resp 16   Ht 1.88 m (6' 2\")   Wt 93.4 kg (206 lb)   SpO2 99%   BMI 26.45 kg/m     Body mass index is 26.45 kg/m .    Gen-NAD  Eye-no conjunctival icterus  CVS- RRR, no murmurs  RS- CTA bilaterally  Abd-soft, nontender, non distended.   Extr- 2+ radial pulses bilaterally, no lower extremity edema bilaterally  MS- hands without clubbing.  No palmar erythema  Skin- no jaundice  Psych- normal mood    Laboratory  BMP  Recent Labs   Lab Test 05/23/25  1119 02/05/25  0834 11/05/24  0852 12/27/23  0727    140 139 141   POTASSIUM 4.4 4.4 5.0 4.7   CHLORIDE 104 103 102 103   GULSHAN 9.3 9.9 9.8 9.7   CO2 27 26 25 29   BUN 13.9 14.7 13.3 14.8   CR 0.90 0.96 1.02 0.90   GLC 91 95 98 102*     CBC  Recent Labs   Lab Test 07/16/25  1034 05/23/25  1119 02/05/25  0834 11/05/24  0852   WBC 8.1 8.7 7.8 7.7   RBC 5.55 5.39 5.55 5.66   HGB 16.0 15.5 16.3 16.7   HCT 48.7 46.3 50.1 49.7   MCV 88 86 90 88   MCH 28.8 28.8 29.4 29.5   MCHC 32.9 33.5 32.5 33.6   RDW 12.5 12.5 12.2 12.1    212 227 221     Liver Enzymes   Recent Labs   Lab Test 05/23/25  1119   PROTTOTAL 7.5   ALBUMIN 4.3   BILITOTAL 0.5   ALKPHOS 187*   AST 45   ALT 94*      INR   INR   Date Value Ref Range Status   07/16/2025 0.92 0.85 - 1.15 Final      Liver biopsy 3/4/2025  - Portal tract changes concerning for large duct stricture (see comment)     Radiology  Abdominal ultrasound 1/7/2025  Diffuse hepatic steatosis. Nodular contour of the liver is present suggestive of cirrhosis. No focal mass.    Fibroscan 4/2025  1. Estimated liver fibrosis is Stage 0-1   2. Steatosis " Grade S0     Endoscopy  ERCP 5/23/2025   - Selective bilairy cannulation and uncomplicated                          biliary sphincterotomy.                          - Cholangiogram with a single short and segmental                          biliary stricture in the middle third of the main bile                          duct with upstream mild main ductal dilation. Another                          stricture was seen in the left main duct with upstream                          left intrhaepatics dilation. Biliary tree otherwise                          with diffuse PSC pattern.                          - The biliary tree was swept and debris was found.                          - CBD as well as LIH strictures were successfully                          dilated to 4 mm.                          - Cells for cytology/FISH obtained from the middle                          third of the main bile duct.                          - One 5 Fr x 7 cm Dennison ''fall out'' plastic stent was                          placed into the common bile duct.     FISH: negative    COMMON BILE DUCT, COMMON BILE DUCT STRICTURE, BILE DUCT BRUSHING:  Interpretation -   - Biliary epithelium with moderate cytologic atypia, see comment  There are epithelial groups with moderate cytologic atypia characterized by anisonucleosis and prominent nucleoli. Overall the cytologic features are concerning for dysplasia; however, in the context of biliary stent, reactive atypia cannot be entirely excluded. Clinical correlation is recommended.              Again, thank you for allowing me to participate in the care of your patient.        Sincerely,        Mireya Schuster MD    Electronically signed

## 2025-07-16 NOTE — NURSING NOTE
"Chief Complaint   Patient presents with    Follow Up     Follow up visit       /85   Pulse 63   Resp 16   Ht 1.88 m (6' 2\")   Wt 93.4 kg (206 lb)   SpO2 99%   BMI 26.45 kg/m      Raulito Bar, KRISTIN CMA at 10:48 AM on 7/16/2025     "

## 2025-07-17 LAB — IGG SERPL-MCNC: 1344 MG/DL (ref 610–1616)

## 2025-08-12 ENCOUNTER — TELEPHONE (OUTPATIENT)
Dept: GASTROENTEROLOGY | Facility: CLINIC | Age: 35
End: 2025-08-12
Payer: COMMERCIAL

## 2025-08-19 ENCOUNTER — PRE VISIT (OUTPATIENT)
Dept: SURGERY | Facility: CLINIC | Age: 35
End: 2025-08-19

## 2025-08-19 ENCOUNTER — VIRTUAL VISIT (OUTPATIENT)
Dept: SURGERY | Facility: CLINIC | Age: 35
End: 2025-08-19
Payer: COMMERCIAL

## 2025-08-19 VITALS — BODY MASS INDEX: 26.44 KG/M2 | HEIGHT: 74 IN | WEIGHT: 206 LBS

## 2025-08-19 DIAGNOSIS — K83.01 PRIMARY SCLEROSING CHOLANGITIS (H): ICD-10-CM

## 2025-08-19 DIAGNOSIS — Z01.818 PRE-OPERATIVE EXAMINATION: Primary | ICD-10-CM

## 2025-08-19 PROCEDURE — 98006 SYNCH AUDIO-VIDEO EST MOD 30: CPT | Performed by: PHYSICIAN ASSISTANT

## 2025-08-19 ASSESSMENT — LIFESTYLE VARIABLES: TOBACCO_USE: 0

## 2025-08-19 ASSESSMENT — ENCOUNTER SYMPTOMS
DYSRHYTHMIAS: 0
SEIZURES: 0

## 2025-08-22 ENCOUNTER — APPOINTMENT (OUTPATIENT)
Dept: GENERAL RADIOLOGY | Facility: CLINIC | Age: 35
End: 2025-08-22
Attending: INTERNAL MEDICINE
Payer: COMMERCIAL

## 2025-08-22 ENCOUNTER — HOSPITAL ENCOUNTER (OUTPATIENT)
Facility: CLINIC | Age: 35
Discharge: HOME | End: 2025-08-22
Attending: INTERNAL MEDICINE | Admitting: INTERNAL MEDICINE
Payer: COMMERCIAL

## 2025-08-22 VITALS
RESPIRATION RATE: 16 BRPM | TEMPERATURE: 97.7 F | DIASTOLIC BLOOD PRESSURE: 92 MMHG | WEIGHT: 212.74 LBS | BODY MASS INDEX: 27.3 KG/M2 | OXYGEN SATURATION: 99 % | SYSTOLIC BLOOD PRESSURE: 132 MMHG | HEART RATE: 66 BPM | HEIGHT: 74 IN

## 2025-08-22 DIAGNOSIS — K83.01 PRIMARY SCLEROSING CHOLANGITIS (H): Primary | ICD-10-CM

## 2025-08-22 LAB
ALBUMIN SERPL BCG-MCNC: 4.1 G/DL (ref 3.5–5.2)
ALP SERPL-CCNC: 175 U/L (ref 40–150)
ALT SERPL W P-5'-P-CCNC: 83 U/L (ref 0–70)
ANION GAP SERPL CALCULATED.3IONS-SCNC: 11 MMOL/L (ref 7–15)
AST SERPL W P-5'-P-CCNC: 46 U/L (ref 0–45)
BILIRUB SERPL-MCNC: 0.5 MG/DL
BUN SERPL-MCNC: 16 MG/DL (ref 6–20)
CALCIUM SERPL-MCNC: 9.4 MG/DL (ref 8.8–10.4)
CHLORIDE SERPL-SCNC: 105 MMOL/L (ref 98–107)
CREAT SERPL-MCNC: 1.01 MG/DL (ref 0.67–1.17)
EGFRCR SERPLBLD CKD-EPI 2021: >90 ML/MIN/1.73M2
ERCP: NORMAL
ERYTHROCYTE [DISTWIDTH] IN BLOOD BY AUTOMATED COUNT: 12.5 % (ref 10–15)
GLUCOSE SERPL-MCNC: 102 MG/DL (ref 70–99)
HCO3 SERPL-SCNC: 24 MMOL/L (ref 22–29)
HCT VFR BLD AUTO: 45.2 % (ref 40–53)
HGB BLD-MCNC: 15 G/DL (ref 13.3–17.7)
INR PPP: 0.98 (ref 0.85–1.15)
LIPASE SERPL-CCNC: 33 U/L (ref 13–60)
MCH RBC QN AUTO: 28.7 PG (ref 26.5–33)
MCHC RBC AUTO-ENTMCNC: 33.2 G/DL (ref 31.5–36.5)
MCV RBC AUTO: 86.6 FL (ref 78–100)
PLATELET # BLD AUTO: 204 10E3/UL (ref 150–450)
POTASSIUM SERPL-SCNC: 4.2 MMOL/L (ref 3.4–5.3)
PROT SERPL-MCNC: 7.4 G/DL (ref 6.4–8.3)
PROTHROMBIN TIME: 13.3 SECONDS (ref 11.8–14.8)
RBC # BLD AUTO: 5.22 10E6/UL (ref 4.4–5.9)
SODIUM SERPL-SCNC: 140 MMOL/L (ref 135–145)
WBC # BLD AUTO: 8.76 10E3/UL (ref 4–11)

## 2025-08-22 PROCEDURE — 710N000010 HC RECOVERY PHASE 1, LEVEL 2, PER MIN: Performed by: INTERNAL MEDICINE

## 2025-08-22 PROCEDURE — 85610 PROTHROMBIN TIME: CPT

## 2025-08-22 PROCEDURE — 85014 HEMATOCRIT: CPT

## 2025-08-22 PROCEDURE — 36415 COLL VENOUS BLD VENIPUNCTURE: CPT

## 2025-08-22 PROCEDURE — 360N000083 HC SURGERY LEVEL 3 W/ FLUORO, PER MIN: Performed by: INTERNAL MEDICINE

## 2025-08-22 PROCEDURE — C1769 GUIDE WIRE: HCPCS | Performed by: INTERNAL MEDICINE

## 2025-08-22 PROCEDURE — 999N000141 HC STATISTIC PRE-PROCEDURE NURSING ASSESSMENT: Performed by: INTERNAL MEDICINE

## 2025-08-22 PROCEDURE — 272N000001 HC OR GENERAL SUPPLY STERILE: Performed by: INTERNAL MEDICINE

## 2025-08-22 PROCEDURE — 999N000181 XR SURGERY CARM FLUORO GREATER THAN 5 MIN W STILLS

## 2025-08-22 PROCEDURE — 710N000012 HC RECOVERY PHASE 2, PER MINUTE: Performed by: INTERNAL MEDICINE

## 2025-08-22 PROCEDURE — 84155 ASSAY OF PROTEIN SERUM: CPT

## 2025-08-22 PROCEDURE — 370N000017 HC ANESTHESIA TECHNICAL FEE, PER MIN: Performed by: INTERNAL MEDICINE

## 2025-08-22 PROCEDURE — 255N000002 HC RX 255 OP 636: Performed by: INTERNAL MEDICINE

## 2025-08-22 PROCEDURE — C1726 CATH, BAL DIL, NON-VASCULAR: HCPCS | Performed by: INTERNAL MEDICINE

## 2025-08-22 PROCEDURE — 250N000009 HC RX 250: Performed by: INTERNAL MEDICINE

## 2025-08-22 PROCEDURE — 83690 ASSAY OF LIPASE: CPT

## 2025-08-22 RX ORDER — ONDANSETRON 2 MG/ML
4 INJECTION INTRAMUSCULAR; INTRAVENOUS EVERY 30 MIN PRN
Status: DISCONTINUED | OUTPATIENT
Start: 2025-08-22 | End: 2025-08-22 | Stop reason: HOSPADM

## 2025-08-22 RX ORDER — NALOXONE HYDROCHLORIDE 0.4 MG/ML
0.1 INJECTION, SOLUTION INTRAMUSCULAR; INTRAVENOUS; SUBCUTANEOUS
Status: DISCONTINUED | OUTPATIENT
Start: 2025-08-22 | End: 2025-08-22 | Stop reason: HOSPADM

## 2025-08-22 RX ORDER — INDOMETHACIN 50 MG/1
SUPPOSITORY RECTAL PRN
Status: DISCONTINUED | OUTPATIENT
Start: 2025-08-22 | End: 2025-08-22 | Stop reason: HOSPADM

## 2025-08-22 RX ORDER — OXYCODONE HYDROCHLORIDE 5 MG/1
5 TABLET ORAL
Status: DISCONTINUED | OUTPATIENT
Start: 2025-08-22 | End: 2025-08-22 | Stop reason: HOSPADM

## 2025-08-22 RX ORDER — FENTANYL CITRATE 50 UG/ML
50 INJECTION, SOLUTION INTRAMUSCULAR; INTRAVENOUS EVERY 5 MIN PRN
Status: DISCONTINUED | OUTPATIENT
Start: 2025-08-22 | End: 2025-08-22 | Stop reason: HOSPADM

## 2025-08-22 RX ORDER — CIPROFLOXACIN 500 MG/1
500 TABLET, FILM COATED ORAL 2 TIMES DAILY
Qty: 10 TABLET | Refills: 0 | Status: SHIPPED | OUTPATIENT
Start: 2025-08-22 | End: 2025-08-27

## 2025-08-22 RX ORDER — ONDANSETRON 4 MG/1
4 TABLET, ORALLY DISINTEGRATING ORAL EVERY 30 MIN PRN
Status: DISCONTINUED | OUTPATIENT
Start: 2025-08-22 | End: 2025-08-22 | Stop reason: HOSPADM

## 2025-08-22 RX ORDER — HYDROMORPHONE HYDROCHLORIDE 1 MG/ML
0.2 INJECTION, SOLUTION INTRAMUSCULAR; INTRAVENOUS; SUBCUTANEOUS EVERY 5 MIN PRN
Status: DISCONTINUED | OUTPATIENT
Start: 2025-08-22 | End: 2025-08-22 | Stop reason: HOSPADM

## 2025-08-22 RX ORDER — SODIUM CHLORIDE, SODIUM LACTATE, POTASSIUM CHLORIDE, CALCIUM CHLORIDE 600; 310; 30; 20 MG/100ML; MG/100ML; MG/100ML; MG/100ML
INJECTION, SOLUTION INTRAVENOUS CONTINUOUS
Status: DISCONTINUED | OUTPATIENT
Start: 2025-08-22 | End: 2025-08-22 | Stop reason: HOSPADM

## 2025-08-22 RX ORDER — LIDOCAINE 40 MG/G
CREAM TOPICAL
Status: DISCONTINUED | OUTPATIENT
Start: 2025-08-22 | End: 2025-08-22 | Stop reason: HOSPADM

## 2025-08-22 ASSESSMENT — ACTIVITIES OF DAILY LIVING (ADL)
ADLS_ACUITY_SCORE: 35

## 2025-08-28 ENCOUNTER — ANESTHESIA (OUTPATIENT)
Dept: SURGERY | Facility: AMBULATORY SURGERY CENTER | Age: 35
End: 2025-08-28
Payer: COMMERCIAL

## 2025-08-28 ENCOUNTER — ANESTHESIA EVENT (OUTPATIENT)
Dept: SURGERY | Facility: AMBULATORY SURGERY CENTER | Age: 35
End: 2025-08-28
Payer: COMMERCIAL

## 2025-08-28 VITALS — HEART RATE: 72 BPM

## 2025-08-28 RX ORDER — SODIUM CHLORIDE, SODIUM LACTATE, POTASSIUM CHLORIDE, CALCIUM CHLORIDE 600; 310; 30; 20 MG/100ML; MG/100ML; MG/100ML; MG/100ML
INJECTION, SOLUTION INTRAVENOUS CONTINUOUS PRN
Status: DISCONTINUED | OUTPATIENT
Start: 2025-08-28 | End: 2025-08-28

## 2025-08-28 RX ORDER — PROPOFOL 10 MG/ML
INJECTION, EMULSION INTRAVENOUS CONTINUOUS PRN
Status: DISCONTINUED | OUTPATIENT
Start: 2025-08-28 | End: 2025-08-28

## 2025-08-28 RX ORDER — LIDOCAINE HYDROCHLORIDE 20 MG/ML
INJECTION, SOLUTION INFILTRATION; PERINEURAL PRN
Status: DISCONTINUED | OUTPATIENT
Start: 2025-08-28 | End: 2025-08-28

## 2025-08-28 RX ORDER — PROPOFOL 10 MG/ML
INJECTION, EMULSION INTRAVENOUS PRN
Status: DISCONTINUED | OUTPATIENT
Start: 2025-08-28 | End: 2025-08-28

## 2025-08-28 RX ADMIN — PROPOFOL 100 MG: 10 INJECTION, EMULSION INTRAVENOUS at 07:44

## 2025-08-28 RX ADMIN — PROPOFOL 200 MCG/KG/MIN: 10 INJECTION, EMULSION INTRAVENOUS at 07:44

## 2025-08-28 RX ADMIN — SODIUM CHLORIDE, SODIUM LACTATE, POTASSIUM CHLORIDE, CALCIUM CHLORIDE: 600; 310; 30; 20 INJECTION, SOLUTION INTRAVENOUS at 07:42

## 2025-08-28 RX ADMIN — LIDOCAINE HYDROCHLORIDE 100 MG: 20 INJECTION, SOLUTION INFILTRATION; PERINEURAL at 07:44

## (undated) DEVICE — ENDO BITE BLOCK ADULT OLYMPUS LATEX FREE MAJ-1632

## (undated) DEVICE — GOWN XLG DISP 9545

## (undated) DEVICE — ESU HOLDER LAP INST DISP PURPLE LONG 330MM H-PRO-330

## (undated) DEVICE — PACK LAP CHOLE SLC15LCFSD

## (undated) DEVICE — KIT ENDO FIRST STEP DISINFECTANT 200ML W/POUCH EP-4

## (undated) DEVICE — CATH IV 16X1-1/4 PROTECTIV 326210

## (undated) DEVICE — ENDO POUCH UNIV RETRIEVAL SYSTEM INZII 10MM CD001

## (undated) DEVICE — SU VICRYL 4-0 PS-2 18" UND J496H

## (undated) DEVICE — NDL BIOPSY TEMNO 18GAX15CM ACT1815

## (undated) DEVICE — WIRE GUIDE 0.025"X270CM ANG VISIGLIDE G-240-2527A

## (undated) DEVICE — ESU GROUND PAD UNIVERSAL W/O CORD

## (undated) DEVICE — ENDO ENDO DISTAL MAJ-2315

## (undated) DEVICE — KIT CONNECTOR FOR OLYMPUS ENDOSCOPES DEFENDO 100310

## (undated) DEVICE — SOL WATER IRRIG 1000ML BOTTLE 2F7114

## (undated) DEVICE — ENDO FUSION OMNI-TOME G31903

## (undated) DEVICE — ENDO TUBING CO2 SMARTCAP STERILE DISP 100145CO2EXT

## (undated) DEVICE — ENDO TROCAR SLEEVE KII Z-THREADED 05X100MM CTS02

## (undated) DEVICE — SUCTION MANIFOLD NEPTUNE 2 SYS 4 PORT 0702-020-000

## (undated) DEVICE — PREP CHLORAPREP 26ML TINTED ORANGE  260815

## (undated) DEVICE — PROBE COVER INTRAOPERATIVE 5"X96" PC1308

## (undated) DEVICE — GUIDEWIRE NOVAGOLD .018X480CM STR TIP M00552010

## (undated) DEVICE — SOL NACL 0.9% INJ 250ML BAG 2B1322Q

## (undated) DEVICE — BIOPSY VALVE BIOSHIELD 00711135

## (undated) DEVICE — KIT ENDO TURNOVER/PROCEDURE W/CLEAN A SCOPE LINERS 103888

## (undated) DEVICE — PACK ENDOSCOPY GI CUSTOM UMMC

## (undated) DEVICE — LINEN GOWN XLG 5407

## (undated) DEVICE — ENDO TROCAR FIRST ENTRY KII FIOS Z-THRD 05X100MM CTF03

## (undated) DEVICE — SUCTION CANISTER MEDIVAC LINER 3000ML W/LID 65651-530

## (undated) DEVICE — DECANTER BAG 2002S

## (undated) DEVICE — LINEN TOWEL PACK X5 5464

## (undated) DEVICE — GLOVE BIOGEL PI ULTRATOUCH G SZ 8.0 42180

## (undated) DEVICE — DEVICE SUTURE GRASPER TROCAR CLOSURE 14GA PMITCSG

## (undated) DEVICE — GLOVE GAMMEX DERMAPRENE ULTRA SZ 8.5 LF 8517

## (undated) DEVICE — ENDO CATH BALLOON DILATION HURRICANE 06MMX2X180CM M00545910

## (undated) DEVICE — GELFOAM 7X12MM

## (undated) DEVICE — ENDO CATH BALLOON DILATION HURRICANE 04MMX4X180CM M00545900

## (undated) DEVICE — ENDO FORCEP ENDOJAW BIOPSY 2.8MMX160CM FB-220K

## (undated) DEVICE — BITE BLOCK ENDO W/DENTAL RIM 54FR SBT-114-100

## (undated) DEVICE — Device

## (undated) DEVICE — INFLATION DEVICE BIG 60 ENDO-AN6012

## (undated) DEVICE — SPECIMEN CONTAINER W/10% BUFFERED FORMALIN 120ML 591201

## (undated) DEVICE — NDL 18GAX1.5" 305185

## (undated) DEVICE — ENDO FUSION OMNI-TOME 21 FS-OMNI-21 G48675

## (undated) DEVICE — GLOVE BIOGEL PI ULTRATOUCH G SZ 7.5 42175

## (undated) DEVICE — TORPEDO GELATIN FOAM 2.5X20MM HYDROPHILIC YELLOW TOR2520

## (undated) DEVICE — CATH RETRIEVAL BALLOON EXTRACTOR PRO RX-S INJ ABOVE 9-12MM

## (undated) DEVICE — SOL NACL 0.9% INJ 1000ML BAG 2B1324X

## (undated) DEVICE — WIRE GUIDE BENSON STR .035X50CM G00661

## (undated) DEVICE — KIT SURGICAL TURNOVER FVSD-01D

## (undated) DEVICE — ENDO TROCAR FIRST ENTRY KII FIOS Z-THRD 11X100MM CTF33

## (undated) DEVICE — BLADE KNIFE SURG 11 371111

## (undated) DEVICE — SU VICRYL 0 UR-6 27" J603H

## (undated) DEVICE — SUCTION IRR STRYKERFLOW II W/TIP 250-070-520

## (undated) DEVICE — DRSG PRIMAPORE 02X3" 7133

## (undated) DEVICE — CLIP APPLIER ENDO ROTATING 10MM MED/LG ER320

## (undated) RX ORDER — PROPOFOL 10 MG/ML
INJECTION, EMULSION INTRAVENOUS
Status: DISPENSED
Start: 2025-05-23

## (undated) RX ORDER — ONDANSETRON 2 MG/ML
INJECTION INTRAMUSCULAR; INTRAVENOUS
Status: DISPENSED
Start: 2025-08-22

## (undated) RX ORDER — FENTANYL CITRATE 50 UG/ML
INJECTION, SOLUTION INTRAMUSCULAR; INTRAVENOUS
Status: DISPENSED
Start: 2019-10-21

## (undated) RX ORDER — DEXAMETHASONE SODIUM PHOSPHATE 4 MG/ML
INJECTION, SOLUTION INTRA-ARTICULAR; INTRALESIONAL; INTRAMUSCULAR; INTRAVENOUS; SOFT TISSUE
Status: DISPENSED
Start: 2025-08-22

## (undated) RX ORDER — EPINEPHRINE 1 MG/ML
INJECTION, SOLUTION INTRAMUSCULAR; SUBCUTANEOUS
Status: DISPENSED
Start: 2019-09-21

## (undated) RX ORDER — CEFAZOLIN SODIUM 1 G/3ML
INJECTION, POWDER, FOR SOLUTION INTRAMUSCULAR; INTRAVENOUS
Status: DISPENSED
Start: 2019-09-21

## (undated) RX ORDER — FENTANYL CITRATE-0.9 % NACL/PF 10 MCG/ML
PLASTIC BAG, INJECTION (ML) INTRAVENOUS
Status: DISPENSED
Start: 2025-08-22

## (undated) RX ORDER — LIDOCAINE HYDROCHLORIDE 10 MG/ML
INJECTION, SOLUTION EPIDURAL; INFILTRATION; INTRACAUDAL; PERINEURAL
Status: DISPENSED
Start: 2019-09-21

## (undated) RX ORDER — BUPIVACAINE HYDROCHLORIDE 5 MG/ML
INJECTION, SOLUTION EPIDURAL; INTRACAUDAL
Status: DISPENSED
Start: 2019-09-21

## (undated) RX ORDER — FENTANYL CITRATE 50 UG/ML
INJECTION, SOLUTION INTRAMUSCULAR; INTRAVENOUS
Status: DISPENSED
Start: 2025-08-22

## (undated) RX ORDER — ONDANSETRON 2 MG/ML
INJECTION INTRAMUSCULAR; INTRAVENOUS
Status: DISPENSED
Start: 2021-03-26

## (undated) RX ORDER — NEOSTIGMINE METHYLSULFATE 1 MG/ML
VIAL (ML) INJECTION
Status: DISPENSED
Start: 2019-09-21

## (undated) RX ORDER — FENTANYL CITRATE 50 UG/ML
INJECTION, SOLUTION INTRAMUSCULAR; INTRAVENOUS
Status: DISPENSED
Start: 2025-03-04

## (undated) RX ORDER — HYDROCODONE BITARTRATE AND ACETAMINOPHEN 5; 325 MG/1; MG/1
TABLET ORAL
Status: DISPENSED
Start: 2019-09-21

## (undated) RX ORDER — HYDROMORPHONE HYDROCHLORIDE 1 MG/ML
INJECTION, SOLUTION INTRAMUSCULAR; INTRAVENOUS; SUBCUTANEOUS
Status: DISPENSED
Start: 2025-05-23

## (undated) RX ORDER — FENTANYL CITRATE 50 UG/ML
INJECTION, SOLUTION INTRAMUSCULAR; INTRAVENOUS
Status: DISPENSED
Start: 2021-03-26

## (undated) RX ORDER — LIDOCAINE HYDROCHLORIDE 10 MG/ML
INJECTION, SOLUTION EPIDURAL; INFILTRATION; INTRACAUDAL; PERINEURAL
Status: DISPENSED
Start: 2025-03-04

## (undated) RX ORDER — PROPOFOL 10 MG/ML
INJECTION, EMULSION INTRAVENOUS
Status: DISPENSED
Start: 2025-08-22

## (undated) RX ORDER — DIPHENHYDRAMINE HYDROCHLORIDE 50 MG/ML
INJECTION, SOLUTION INTRAMUSCULAR; INTRAVENOUS
Status: DISPENSED
Start: 2025-03-04

## (undated) RX ORDER — CIPROFLOXACIN 2 MG/ML
INJECTION, SOLUTION INTRAVENOUS
Status: DISPENSED
Start: 2025-05-23

## (undated) RX ORDER — FENTANYL CITRATE 50 UG/ML
INJECTION, SOLUTION INTRAMUSCULAR; INTRAVENOUS
Status: DISPENSED
Start: 2025-05-23

## (undated) RX ORDER — SIMETHICONE 40MG/0.6ML
SUSPENSION, DROPS(FINAL DOSAGE FORM)(ML) ORAL
Status: DISPENSED
Start: 2019-10-21

## (undated) RX ORDER — DEXAMETHASONE SODIUM PHOSPHATE 4 MG/ML
INJECTION, SOLUTION INTRA-ARTICULAR; INTRALESIONAL; INTRAMUSCULAR; INTRAVENOUS; SOFT TISSUE
Status: DISPENSED
Start: 2019-09-21

## (undated) RX ORDER — ONDANSETRON 2 MG/ML
INJECTION INTRAMUSCULAR; INTRAVENOUS
Status: DISPENSED
Start: 2025-03-04

## (undated) RX ORDER — FENTANYL CITRATE 50 UG/ML
INJECTION, SOLUTION INTRAMUSCULAR; INTRAVENOUS
Status: DISPENSED
Start: 2019-09-21

## (undated) RX ORDER — LIDOCAINE HYDROCHLORIDE 20 MG/ML
INJECTION, SOLUTION EPIDURAL; INFILTRATION; INTRACAUDAL; PERINEURAL
Status: DISPENSED
Start: 2019-09-21

## (undated) RX ORDER — GLYCOPYRROLATE 0.2 MG/ML
INJECTION, SOLUTION INTRAMUSCULAR; INTRAVENOUS
Status: DISPENSED
Start: 2019-09-21

## (undated) RX ORDER — PROPOFOL 10 MG/ML
INJECTION, EMULSION INTRAVENOUS
Status: DISPENSED
Start: 2019-09-21

## (undated) RX ORDER — CEFAZOLIN SODIUM/WATER 2 G/20 ML
SYRINGE (ML) INTRAVENOUS
Status: DISPENSED
Start: 2025-08-22

## (undated) RX ORDER — ONDANSETRON 2 MG/ML
INJECTION INTRAMUSCULAR; INTRAVENOUS
Status: DISPENSED
Start: 2019-09-21